# Patient Record
Sex: MALE | Race: WHITE | Employment: FULL TIME | URBAN - METROPOLITAN AREA
[De-identification: names, ages, dates, MRNs, and addresses within clinical notes are randomized per-mention and may not be internally consistent; named-entity substitution may affect disease eponyms.]

---

## 2017-09-26 ENCOUNTER — OFFICE VISIT (OUTPATIENT)
Dept: FAMILY MEDICINE CLINIC | Age: 57
End: 2017-09-26

## 2017-09-26 VITALS
DIASTOLIC BLOOD PRESSURE: 110 MMHG | RESPIRATION RATE: 16 BRPM | TEMPERATURE: 98.8 F | HEART RATE: 68 BPM | WEIGHT: 229 LBS | BODY MASS INDEX: 30.35 KG/M2 | SYSTOLIC BLOOD PRESSURE: 168 MMHG | HEIGHT: 73 IN

## 2017-09-26 DIAGNOSIS — H00.015 HORDEOLUM EXTERNUM OF LEFT LOWER EYELID: Primary | ICD-10-CM

## 2017-09-26 DIAGNOSIS — I10 ESSENTIAL HYPERTENSION: ICD-10-CM

## 2017-09-26 PROCEDURE — 99212 OFFICE O/P EST SF 10 MIN: CPT | Performed by: INTERNAL MEDICINE

## 2017-09-26 RX ORDER — SULFAMETHOXAZOLE AND TRIMETHOPRIM 800; 160 MG/1; MG/1
1 TABLET ORAL 2 TIMES DAILY
Qty: 14 TABLET | Refills: 0 | Status: SHIPPED | OUTPATIENT
Start: 2017-09-26 | End: 2017-10-03

## 2017-09-26 ASSESSMENT — ENCOUNTER SYMPTOMS
EYE DISCHARGE: 0
EYE ITCHING: 0
ABDOMINAL DISTENTION: 0
COLOR CHANGE: 0
BACK PAIN: 0
EYE PAIN: 1
BLURRED VISION: 0
DOUBLE VISION: 0
SHORTNESS OF BREATH: 0
CONSTIPATION: 0
WHEEZING: 0
FOREIGN BODY SENSATION: 0
TROUBLE SWALLOWING: 0
EYE REDNESS: 1
BLOOD IN STOOL: 0
COUGH: 0
SORE THROAT: 0
ABDOMINAL PAIN: 0
VOICE CHANGE: 0
DIARRHEA: 0
NAUSEA: 0
PHOTOPHOBIA: 0

## 2017-09-26 ASSESSMENT — PATIENT HEALTH QUESTIONNAIRE - PHQ9
SUM OF ALL RESPONSES TO PHQ9 QUESTIONS 1 & 2: 0
SUM OF ALL RESPONSES TO PHQ QUESTIONS 1-9: 0
2. FEELING DOWN, DEPRESSED OR HOPELESS: 0
1. LITTLE INTEREST OR PLEASURE IN DOING THINGS: 0

## 2017-10-30 DIAGNOSIS — I10 ESSENTIAL HYPERTENSION: ICD-10-CM

## 2017-10-30 RX ORDER — LOSARTAN POTASSIUM 100 MG/1
TABLET ORAL
Qty: 90 TABLET | Refills: 3 | Status: SHIPPED | OUTPATIENT
Start: 2017-10-30 | End: 2018-10-02 | Stop reason: SDUPTHER

## 2017-10-30 NOTE — TELEPHONE ENCOUNTER
Pharmacy REQUESTING REFILL. ORDER PENDED.  Angel Luis 72.    LOV-12/1/2016    Future Appointments  Date Time Provider Genaro Abreu   11/28/2017 1:30 PM DO DEANDRA Zamora [facility] PCP [facility] EMERGENCY [facility] AT [place]

## 2017-11-28 ENCOUNTER — OFFICE VISIT (OUTPATIENT)
Dept: FAMILY MEDICINE CLINIC | Age: 57
End: 2017-11-28

## 2017-11-28 VITALS
BODY MASS INDEX: 29.55 KG/M2 | DIASTOLIC BLOOD PRESSURE: 80 MMHG | RESPIRATION RATE: 14 BRPM | WEIGHT: 223 LBS | HEART RATE: 66 BPM | TEMPERATURE: 98.1 F | SYSTOLIC BLOOD PRESSURE: 132 MMHG | HEIGHT: 73 IN

## 2017-11-28 DIAGNOSIS — Z23 NEED FOR INFLUENZA VACCINATION: ICD-10-CM

## 2017-11-28 DIAGNOSIS — I10 BENIGN ESSENTIAL HTN: ICD-10-CM

## 2017-11-28 DIAGNOSIS — Z23 NEED FOR PROPHYLACTIC VACCINATION AGAINST DIPHTHERIA-TETANUS-PERTUSSIS (DTP): ICD-10-CM

## 2017-11-28 DIAGNOSIS — Z00.00 ANNUAL PHYSICAL EXAM: Primary | ICD-10-CM

## 2017-11-28 DIAGNOSIS — Z12.5 PROSTATE CANCER SCREENING: ICD-10-CM

## 2017-11-28 PROCEDURE — 90688 IIV4 VACCINE SPLT 0.5 ML IM: CPT | Performed by: FAMILY MEDICINE

## 2017-11-28 PROCEDURE — 90472 IMMUNIZATION ADMIN EACH ADD: CPT | Performed by: FAMILY MEDICINE

## 2017-11-28 PROCEDURE — 90715 TDAP VACCINE 7 YRS/> IM: CPT | Performed by: FAMILY MEDICINE

## 2017-11-28 PROCEDURE — 99396 PREV VISIT EST AGE 40-64: CPT | Performed by: FAMILY MEDICINE

## 2017-11-28 PROCEDURE — 90471 IMMUNIZATION ADMIN: CPT | Performed by: FAMILY MEDICINE

## 2017-11-28 NOTE — PROGRESS NOTES
Subjective  Leyda Osborne, 65 y.o. male presents today with:  Chief Complaint   Patient presents with    Annual Exam       HPI--for annual review  Active and doing well  ; No cardio-pulmonary probs;compliant with diet/rxs;no new gi-gu complaints   Rev/rxs; No abuse nor side effects on meds needs 2-shots  No new sxs  Disc routines;neg c-scope 2012[due in 10y]   Patient presents today for an annual examination. Vaccine Information Sheet, \"Influenza - Inactivated\" OR \"Live - Intranasal\"  given to Leyda Osborne. Patient responses:    Have you ever had a reaction to a flu vaccine? No  Are you able to eat eggs without adverse effects? Yes  Do you have any current illness? No  Have you ever had [place] Syndrome? No    Flu vaccine given per order. Please see immunization tab. Review of Systems   Constitutional: Negative for fatigue, fever and unexpected weight change. Eyes: Negative for visual disturbance. Respiratory: Negative for shortness of breath. Cardiovascular: Negative for chest pain, palpitations and leg swelling. Gastrointestinal: Negative for abdominal pain, blood in stool and nausea. Genitourinary: Positive for frequency. Negative for enuresis, hematuria and testicular pain. Musculoskeletal: Positive for myalgias. Negative for arthralgias. Skin: Negative for rash. Neurological: Negative for tremors, light-headedness and headaches. Hematological: Does not bruise/bleed easily. Psychiatric/Behavioral: Negative for dysphoric mood.        No Known Allergies    Past Medical History:   Diagnosis Date    Diarrhea     INTERMITTENT    Hypertension     Pneumonia 04-30-08    LEFT LOWER LOBE    Tendinitis     RIGHT ROTATOR CUFF    Tobacco dependence in remission      Past Surgical History:   Procedure Laterality Date    APPENDECTOMY      COLONOSCOPY  12/3/12    DR Jaz Soares    ROTATOR CUFF REPAIR  2007    RIGHT    TONSILLECTOMY AND ADENOIDECTOMY      AS A CHILD medications. Return in about 4 months (around 3/28/2018) for htn.  ;See above orders, as use and side effects reviewed ; Outpatient Encounter Prescriptions as of 11/28/2017   Medication Sig Dispense Refill    losartan (COZAAR) 100 MG tablet TAKE 1 TABLET BY MOUTH  DAILY 90 tablet 3    metoprolol succinate (TOPROL XL) 50 MG extended release tablet TAKE 1 TABLET BY MOUTH NIGHTLY 90 tablet 3     No facility-administered encounter medications on file as of 11/28/2017. Call or return to clinic prn if these symptoms worsen or fail to improve as anticipated. Octavio Curiel, DO     ;;  Hospital Outpatient Visit on 08/18/2016   Component Date Value Ref Range Status    Sodium 08/18/2016 140  132 - 144 mEq/L Final    Potassium 08/18/2016 4.3  3.5 - 5.1 mEq/L Final    Chloride 08/18/2016 103  98 - 107 mEq/L Final    CO2 08/18/2016 25  22 - 29 mEq/L Final    Anion Gap 08/18/2016 12  7 - 13 mEq/L Final    Glucose 08/18/2016 90  74 - 109 mg/dL Final    BUN 08/18/2016 14  6 - 20 mg/dL Final    CREATININE 08/18/2016 0.75  0.70 - 1.20 mg/dL Final    GFR Non- 08/18/2016 >60.0  >60 Final    Comment: >60 mL/min/1.73m2 EGFR, calc. for ages 25 and older using the  MDRD formula (not corrected for weight), is valid for stable  renal function.  GFR  08/18/2016 >60.0  >60 Final    Comment: >60 mL/min/1.73m2 EGFR, calc. for ages 25 and older using the  MDRD formula (not corrected for weight), is valid for stable  renal function.  Calcium 08/18/2016 8.9  8.6 - 10.2 mg/dL Final    Cholesterol, Total 08/18/2016 179  0 - 199 mg/dL Final    Triglycerides 08/18/2016 239* 0 - 200 mg/dL Final    HDL 08/18/2016 34* 40 - 59 mg/dL Final    Comment: ATP III HDL Cholestrol Classification is low.   Expected Values:  Males:    >55 = No Risk            35-55 = Moderate Risk            <35 = High Risk  Females:  >65 = No Risk            45-65 = Moderate Risk            <45 = High Risk  NCEP Guidelines: Third Report May 2001  >59 = negative risk factor for CHD  <40 = major risk factor for CHD      LDL Calculated 08/18/2016 97  0 - 129 mg/dL Final    PSA 08/18/2016 2.60  0.00 - 3.89 ng/mL Final    Comment: When the Total PSA is between 3.00 and 10.00 ng/mL, consider  requesting a Free PSA to aid in diagnosis. Assessment & Plan   1. Annual physical exam  Tdap (age 10y-63y) IM (Adacel)    Lipid Panel    Comprehensive Metabolic Panel    Psa screening   2. Need for prophylactic vaccination against diphtheria-tetanus-pertussis (DTP)  Tdap (age 10y-63y) IM (Adacel)   3. Need for influenza vaccination  INFLUENZA, QUADV, 3 YRS AND OLDER, IM, MDV, 0.5ML (Kirke Patee)   4. Benign essential HTN  Lipid Panel    Comprehensive Metabolic Panel   5. Prostate cancer screening  Psa screening     Orders Placed This Encounter   Procedures    Tdap (age 10y-63y) IM (Adacel)    INFLUENZA, QUADV, 3 YRS AND OLDER, IM, MDV, 0.5ML (FLUZONE QUADV)    Lipid Panel     Standing Status:   Future     Number of Occurrences:   1     Standing Expiration Date:   11/28/2018     Order Specific Question:   Is Patient Fasting?/# of Hours     Answer:   10 HOURS    Comprehensive Metabolic Panel     Standing Status:   Future     Number of Occurrences:   1     Standing Expiration Date:   11/28/2018    Psa screening     Standing Status:   Future     Number of Occurrences:   1     Standing Expiration Date:   11/28/2018     No orders of the defined types were placed in this encounter. There are no discontinued medications. Return in about 4 months (around 3/28/2018) for htn. Call or return to clinic prn if these symptoms worsen or fail to improve as anticipated.       Brandon Francis,

## 2017-12-01 DIAGNOSIS — Z12.5 PROSTATE CANCER SCREENING: ICD-10-CM

## 2017-12-01 DIAGNOSIS — Z00.00 ANNUAL PHYSICAL EXAM: ICD-10-CM

## 2017-12-01 DIAGNOSIS — I10 BENIGN ESSENTIAL HTN: ICD-10-CM

## 2017-12-01 LAB
ALBUMIN SERPL-MCNC: 4.5 G/DL (ref 3.9–4.9)
ALP BLD-CCNC: 99 U/L (ref 35–104)
ALT SERPL-CCNC: 27 U/L (ref 0–41)
ANION GAP SERPL CALCULATED.3IONS-SCNC: 13 MEQ/L (ref 7–13)
AST SERPL-CCNC: 24 U/L (ref 0–40)
BILIRUB SERPL-MCNC: 0.7 MG/DL (ref 0–1.2)
BUN BLDV-MCNC: 12 MG/DL (ref 6–20)
CALCIUM SERPL-MCNC: 9.4 MG/DL (ref 8.6–10.2)
CHLORIDE BLD-SCNC: 100 MEQ/L (ref 98–107)
CHOLESTEROL, TOTAL: 205 MG/DL (ref 0–199)
CO2: 27 MEQ/L (ref 22–29)
CREAT SERPL-MCNC: 0.81 MG/DL (ref 0.7–1.2)
GFR AFRICAN AMERICAN: >60
GFR NON-AFRICAN AMERICAN: >60
GLOBULIN: 2.6 G/DL (ref 2.3–3.5)
GLUCOSE BLD-MCNC: 95 MG/DL (ref 74–109)
HDLC SERPL-MCNC: 35 MG/DL (ref 40–59)
LDL CHOLESTEROL CALCULATED: 127 MG/DL (ref 0–129)
POTASSIUM SERPL-SCNC: 4.5 MEQ/L (ref 3.5–5.1)
PROSTATE SPECIFIC ANTIGEN: 2.64 NG/ML (ref 0–3.89)
SODIUM BLD-SCNC: 140 MEQ/L (ref 132–144)
TOTAL PROTEIN: 7.1 G/DL (ref 6.4–8.1)
TRIGL SERPL-MCNC: 214 MG/DL (ref 0–200)

## 2017-12-10 ASSESSMENT — ENCOUNTER SYMPTOMS
BLOOD IN STOOL: 0
SHORTNESS OF BREATH: 0
ABDOMINAL PAIN: 0
NAUSEA: 0

## 2018-02-12 ENCOUNTER — TELEPHONE (OUTPATIENT)
Dept: FAMILY MEDICINE CLINIC | Age: 58
End: 2018-02-12

## 2018-02-12 DIAGNOSIS — I10 ESSENTIAL HYPERTENSION: ICD-10-CM

## 2018-02-12 RX ORDER — METOPROLOL SUCCINATE 50 MG/1
TABLET, EXTENDED RELEASE ORAL
Qty: 90 TABLET | Refills: 3 | Status: SHIPPED | OUTPATIENT
Start: 2018-02-12 | End: 2018-10-02 | Stop reason: SDUPTHER

## 2018-03-27 ENCOUNTER — OFFICE VISIT (OUTPATIENT)
Dept: FAMILY MEDICINE CLINIC | Age: 58
End: 2018-03-27

## 2018-03-27 VITALS
DIASTOLIC BLOOD PRESSURE: 102 MMHG | RESPIRATION RATE: 16 BRPM | HEART RATE: 57 BPM | WEIGHT: 232 LBS | SYSTOLIC BLOOD PRESSURE: 160 MMHG | BODY MASS INDEX: 30.75 KG/M2 | HEIGHT: 73 IN | TEMPERATURE: 97.6 F

## 2018-03-27 DIAGNOSIS — Z71.6 TOBACCO ABUSE COUNSELING: ICD-10-CM

## 2018-03-27 DIAGNOSIS — I10 ESSENTIAL HYPERTENSION: Primary | ICD-10-CM

## 2018-03-27 PROCEDURE — 99213 OFFICE O/P EST LOW 20 MIN: CPT | Performed by: FAMILY MEDICINE

## 2018-03-27 RX ORDER — VARENICLINE TARTRATE 25 MG
KIT ORAL
Qty: 53 TABLET | Refills: 1 | Status: SHIPPED | OUTPATIENT
Start: 2018-03-27 | End: 2018-06-01 | Stop reason: ALTCHOICE

## 2018-03-27 NOTE — PROGRESS NOTES
use: No    Sexual activity: Not on file     Other Topics Concern    Not on file     Social History Narrative    No narrative on file     Family History   Problem Relation Age of Onset    Heart Disease Father     Other Father      LUNG PROBLEMS          Current Outpatient Prescriptions on File Prior to Visit   Medication Sig Dispense Refill    metoprolol succinate (TOPROL XL) 50 MG extended release tablet TAKE 1 TABLET BY MOUTH NIGHTLY 90 tablet 3    losartan (COZAAR) 100 MG tablet TAKE 1 TABLET BY MOUTH  DAILY 90 tablet 3     No current facility-administered medications on file prior to visit. Objective    Vitals:    03/27/18 0951   BP: (!) 160/102   Pulse: 57   Resp: 16   Temp: 97.6 °F (36.4 °C)   TempSrc: Temporal   Weight: 232 lb (105.2 kg)   Height: 6' 0.5\" (1.842 m)     Physical Exam   Constitutional: He is well-developed, well-nourished, and in no distress. No distress. Eyes: No scleral icterus. Neck: Normal range of motion. Neck supple. Carotid bruit is not present. No neck rigidity. No thyroid mass and no thyromegaly present. Cardiovascular: Normal rate, regular rhythm, S1 normal, S2 normal and normal heart sounds. No extrasystoles are present. No murmur heard. Pulmonary/Chest: Effort normal and breath sounds normal.   Musculoskeletal: He exhibits no edema. Skin: He is not diaphoretic. Psychiatric: Mood and affect normal.   ;Normal sensory/vascular foot exam and no lesions bilaterally   Orders Only on 12/01/2017   Component Date Value Ref Range Status    Cholesterol, Total 12/01/2017 205* 0 - 199 mg/dL Final    Triglycerides 12/01/2017 214* 0 - 200 mg/dL Final    HDL 12/01/2017 35* 40 - 59 mg/dL Final    Comment: ATP III HDL Cholestrol Classification is low. Expected Values:    Males:    >55 = No Risk            35-55 = Moderate Risk            <35 = High Risk    Females:  >65 = No Risk            45-65 = Moderate Risk            <45 = High Risk    NCEP Guidelines:   Third in this encounter. Orders Placed This Encounter   Medications    varenicline (CHANTIX STARTING MONTH PAK) 0.5 MG X 11 & 1 MG X 42 tablet     Sig: Take by mouth. Dispense:  53 tablet     Refill:  1   ;  Outpatient Encounter Prescriptions as of 3/27/2018   Medication Sig Dispense Refill    varenicline (CHANTIX STARTING MONTH PAK) 0.5 MG X 11 & 1 MG X 42 tablet Take by mouth. 53 tablet 1    metoprolol succinate (TOPROL XL) 50 MG extended release tablet TAKE 1 TABLET BY MOUTH NIGHTLY 90 tablet 3    losartan (COZAAR) 100 MG tablet TAKE 1 TABLET BY MOUTH  DAILY 90 tablet 3     No facility-administered encounter medications on file as of 3/27/2018. There are no discontinued medications. Return in about 2 months (around 5/27/2018) for htn. Call or return to clinic prn if these symptoms worsen or fail to improve as anticipated.       Victoria Ibarra, DO

## 2018-03-29 ASSESSMENT — ENCOUNTER SYMPTOMS
SHORTNESS OF BREATH: 0
ABDOMINAL PAIN: 0
COUGH: 1
CHOKING: 0
NAUSEA: 0

## 2018-04-24 ENCOUNTER — TELEPHONE (OUTPATIENT)
Dept: FAMILY MEDICINE CLINIC | Age: 58
End: 2018-04-24

## 2018-04-26 DIAGNOSIS — Z72.0 TOBACCO ABUSE DISORDER: Primary | ICD-10-CM

## 2018-04-26 RX ORDER — BUPROPION HYDROCHLORIDE 150 MG/1
TABLET, EXTENDED RELEASE ORAL
Qty: 60 TABLET | Refills: 3 | Status: SHIPPED | OUTPATIENT
Start: 2018-04-26

## 2018-06-01 ENCOUNTER — OFFICE VISIT (OUTPATIENT)
Dept: FAMILY MEDICINE CLINIC | Age: 58
End: 2018-06-01

## 2018-06-01 VITALS
HEIGHT: 73 IN | WEIGHT: 212 LBS | TEMPERATURE: 97.5 F | RESPIRATION RATE: 16 BRPM | HEART RATE: 66 BPM | DIASTOLIC BLOOD PRESSURE: 80 MMHG | BODY MASS INDEX: 28.1 KG/M2 | SYSTOLIC BLOOD PRESSURE: 136 MMHG

## 2018-06-01 DIAGNOSIS — I10 ESSENTIAL HYPERTENSION: Primary | ICD-10-CM

## 2018-06-01 DIAGNOSIS — F17.201 TOBACCO DEPENDENCE IN REMISSION: ICD-10-CM

## 2018-06-01 PROCEDURE — 99213 OFFICE O/P EST LOW 20 MIN: CPT | Performed by: FAMILY MEDICINE

## 2018-06-07 ASSESSMENT — ENCOUNTER SYMPTOMS
BLOOD IN STOOL: 0
SHORTNESS OF BREATH: 0
ABDOMINAL PAIN: 0
CHEST TIGHTNESS: 0

## 2018-10-02 DIAGNOSIS — I10 ESSENTIAL HYPERTENSION: ICD-10-CM

## 2018-10-02 RX ORDER — METOPROLOL SUCCINATE 50 MG/1
TABLET, EXTENDED RELEASE ORAL
Qty: 90 TABLET | Refills: 3 | Status: SHIPPED | OUTPATIENT
Start: 2018-10-02 | End: 2018-10-12 | Stop reason: SDUPTHER

## 2018-10-02 RX ORDER — LOSARTAN POTASSIUM 100 MG/1
TABLET ORAL
Qty: 90 TABLET | Refills: 3 | Status: SHIPPED | OUTPATIENT
Start: 2018-10-02 | End: 2018-10-12 | Stop reason: SDUPTHER

## 2018-10-02 NOTE — TELEPHONE ENCOUNTER
Pt requesting refill please approve or deny.    Last seen 6/1/2018      Future Appointments  Date Time Provider Genaro Abreu   10/5/2018 2:00 PM Michael Velázquez DO [address]

## 2018-10-05 ENCOUNTER — OFFICE VISIT (OUTPATIENT)
Dept: FAMILY MEDICINE CLINIC | Age: 58
End: 2018-10-05
Payer: COMMERCIAL

## 2018-10-05 VITALS
SYSTOLIC BLOOD PRESSURE: 144 MMHG | TEMPERATURE: 98 F | HEART RATE: 67 BPM | WEIGHT: 228 LBS | DIASTOLIC BLOOD PRESSURE: 98 MMHG | RESPIRATION RATE: 14 BRPM | BODY MASS INDEX: 30.22 KG/M2 | HEIGHT: 73 IN

## 2018-10-05 DIAGNOSIS — I10 ESSENTIAL HYPERTENSION: Primary | ICD-10-CM

## 2018-10-05 DIAGNOSIS — Z23 NEED FOR PROPHYLACTIC VACCINATION AGAINST STREPTOCOCCUS PNEUMONIAE (PNEUMOCOCCUS): ICD-10-CM

## 2018-10-05 DIAGNOSIS — Z12.5 PROSTATE CANCER SCREENING: ICD-10-CM

## 2018-10-05 DIAGNOSIS — Z23 NEED FOR INFLUENZA VACCINATION: ICD-10-CM

## 2018-10-05 PROCEDURE — 90688 IIV4 VACCINE SPLT 0.5 ML IM: CPT | Performed by: FAMILY MEDICINE

## 2018-10-05 PROCEDURE — G8417 CALC BMI ABV UP PARAM F/U: HCPCS | Performed by: FAMILY MEDICINE

## 2018-10-05 PROCEDURE — 90732 PPSV23 VACC 2 YRS+ SUBQ/IM: CPT | Performed by: FAMILY MEDICINE

## 2018-10-05 PROCEDURE — 99213 OFFICE O/P EST LOW 20 MIN: CPT | Performed by: FAMILY MEDICINE

## 2018-10-05 PROCEDURE — G8427 DOCREV CUR MEDS BY ELIG CLIN: HCPCS | Performed by: FAMILY MEDICINE

## 2018-10-05 PROCEDURE — 90471 IMMUNIZATION ADMIN: CPT | Performed by: FAMILY MEDICINE

## 2018-10-05 PROCEDURE — 90472 IMMUNIZATION ADMIN EACH ADD: CPT | Performed by: FAMILY MEDICINE

## 2018-10-05 PROCEDURE — G8482 FLU IMMUNIZE ORDER/ADMIN: HCPCS | Performed by: FAMILY MEDICINE

## 2018-10-05 PROCEDURE — 4004F PT TOBACCO SCREEN RCVD TLK: CPT | Performed by: FAMILY MEDICINE

## 2018-10-05 PROCEDURE — 3017F COLORECTAL CA SCREEN DOC REV: CPT | Performed by: FAMILY MEDICINE

## 2018-10-05 RX ORDER — NEBIVOLOL 5 MG/1
5 TABLET ORAL DAILY
Qty: 7 TABLET | Refills: 0 | COMMUNITY
Start: 2018-10-05 | End: 2018-12-19 | Stop reason: ALTCHOICE

## 2018-10-05 ASSESSMENT — PATIENT HEALTH QUESTIONNAIRE - PHQ9
SUM OF ALL RESPONSES TO PHQ9 QUESTIONS 1 & 2: 0
SUM OF ALL RESPONSES TO PHQ QUESTIONS 1-9: 0
2. FEELING DOWN, DEPRESSED OR HOPELESS: 0
1. LITTLE INTEREST OR PLEASURE IN DOING THINGS: 0
SUM OF ALL RESPONSES TO PHQ QUESTIONS 1-9: 0

## 2018-10-05 NOTE — PROGRESS NOTES
name: N/A    Number of children: N/A    Years of education: N/A     Occupational History    Not on file. Social History Main Topics    Smoking status: Current Some Day Smoker     Packs/day: 1.00     Years: 30.00     Types: Cigarettes     Last attempt to quit: 3/6/2015    Smokeless tobacco: Never Used    Alcohol use Yes      Comment: occasionally    Drug use: No    Sexual activity: Not on file     Other Topics Concern    Not on file     Social History Narrative    No narrative on file     Family History   Problem Relation Age of Onset    Heart Disease Father     Other Father         LUNG PROBLEMS          Current Outpatient Prescriptions on File Prior to Visit   Medication Sig Dispense Refill    buPROPion (WELLBUTRIN SR) 150 MG extended release tablet Take 1 daily in am x 3 days, then 1 bid at breakfast and lunch thereafter 60 tablet 3     No current facility-administered medications on file prior to visit. Objective    Vitals:    10/05/18 1408   BP: (!) 144/98   Site: Right Upper Arm   Position: Sitting   Cuff Size: Large Adult   Pulse: 67   Resp: 14   Temp: 98 °F (36.7 °C)   TempSrc: Temporal   Weight: 228 lb (103.4 kg)   Height: 6' 0.5\" (1.842 m)   150/96[off rxs today]  Physical Exam   Constitutional: He is oriented to person, place, and time and well-developed, well-nourished, and in no distress. No distress. Eyes: No scleral icterus. Neck: Normal range of motion and full passive range of motion without pain. Neck supple. No JVD present. Carotid bruit is not present. No neck rigidity. No edema present. No thyroid mass and no thyromegaly present. Cardiovascular: Normal rate, regular rhythm, S1 normal, S2 normal and normal heart sounds. No extrasystoles are present. No murmur heard. Pulmonary/Chest: Effort normal and breath sounds normal. No stridor. Musculoskeletal: He exhibits no edema. Neurological: He is alert and oriented to person, place, and time.  He has normal

## 2018-10-11 ENCOUNTER — TELEPHONE (OUTPATIENT)
Dept: FAMILY MEDICINE CLINIC | Age: 58
End: 2018-10-11

## 2018-10-11 DIAGNOSIS — I10 ESSENTIAL HYPERTENSION: ICD-10-CM

## 2018-10-12 RX ORDER — LOSARTAN POTASSIUM 100 MG/1
TABLET ORAL
Qty: 90 TABLET | Refills: 3 | Status: SHIPPED | OUTPATIENT
Start: 2018-10-12

## 2018-10-12 RX ORDER — METOPROLOL SUCCINATE 50 MG/1
TABLET, EXTENDED RELEASE ORAL
Qty: 90 TABLET | Refills: 3 | Status: SHIPPED | OUTPATIENT
Start: 2018-10-12

## 2018-10-14 ASSESSMENT — ENCOUNTER SYMPTOMS
CONSTIPATION: 0
BLOOD IN STOOL: 0
SHORTNESS OF BREATH: 0
ANAL BLEEDING: 0

## 2018-12-12 DIAGNOSIS — I10 ESSENTIAL HYPERTENSION: ICD-10-CM

## 2018-12-12 DIAGNOSIS — Z12.5 PROSTATE CANCER SCREENING: ICD-10-CM

## 2018-12-12 LAB
ALBUMIN SERPL-MCNC: 4.5 G/DL (ref 3.9–4.9)
ALP BLD-CCNC: 110 U/L (ref 35–104)
ALT SERPL-CCNC: 48 U/L (ref 0–41)
ANION GAP SERPL CALCULATED.3IONS-SCNC: 14 MEQ/L (ref 7–13)
AST SERPL-CCNC: 36 U/L (ref 0–40)
BILIRUB SERPL-MCNC: 0.8 MG/DL (ref 0–1.2)
BUN BLDV-MCNC: 12 MG/DL (ref 6–20)
CALCIUM SERPL-MCNC: 9.2 MG/DL (ref 8.6–10.2)
CHLORIDE BLD-SCNC: 100 MEQ/L (ref 98–107)
CHOLESTEROL, TOTAL: 195 MG/DL (ref 0–199)
CO2: 26 MEQ/L (ref 22–29)
CREAT SERPL-MCNC: 0.98 MG/DL (ref 0.7–1.2)
GFR AFRICAN AMERICAN: >60
GFR NON-AFRICAN AMERICAN: >60
GLOBULIN: 3 G/DL (ref 2.3–3.5)
GLUCOSE BLD-MCNC: 92 MG/DL (ref 74–109)
HDLC SERPL-MCNC: 36 MG/DL (ref 40–59)
LDL CHOLESTEROL CALCULATED: 117 MG/DL (ref 0–129)
POTASSIUM SERPL-SCNC: 4.5 MEQ/L (ref 3.5–5.1)
PROSTATE SPECIFIC ANTIGEN: 3.38 NG/ML (ref 0–3.89)
SODIUM BLD-SCNC: 140 MEQ/L (ref 132–144)
TOTAL PROTEIN: 7.5 G/DL (ref 6.4–8.1)
TRIGL SERPL-MCNC: 208 MG/DL (ref 0–200)

## 2018-12-19 ENCOUNTER — OFFICE VISIT (OUTPATIENT)
Dept: FAMILY MEDICINE CLINIC | Age: 58
End: 2018-12-19
Payer: COMMERCIAL

## 2018-12-19 VITALS
WEIGHT: 233 LBS | SYSTOLIC BLOOD PRESSURE: 144 MMHG | BODY MASS INDEX: 30.88 KG/M2 | HEIGHT: 73 IN | TEMPERATURE: 98.3 F | RESPIRATION RATE: 14 BRPM | DIASTOLIC BLOOD PRESSURE: 102 MMHG | HEART RATE: 77 BPM

## 2018-12-19 DIAGNOSIS — N41.0 ACUTE PROSTATITIS: ICD-10-CM

## 2018-12-19 DIAGNOSIS — N40.1 BENIGN PROSTATIC HYPERPLASIA WITH LOWER URINARY TRACT SYMPTOMS, SYMPTOM DETAILS UNSPECIFIED: ICD-10-CM

## 2018-12-19 DIAGNOSIS — I10 ESSENTIAL HYPERTENSION: Primary | ICD-10-CM

## 2018-12-19 PROCEDURE — G8417 CALC BMI ABV UP PARAM F/U: HCPCS | Performed by: FAMILY MEDICINE

## 2018-12-19 PROCEDURE — 4004F PT TOBACCO SCREEN RCVD TLK: CPT | Performed by: FAMILY MEDICINE

## 2018-12-19 PROCEDURE — G8427 DOCREV CUR MEDS BY ELIG CLIN: HCPCS | Performed by: FAMILY MEDICINE

## 2018-12-19 PROCEDURE — G8482 FLU IMMUNIZE ORDER/ADMIN: HCPCS | Performed by: FAMILY MEDICINE

## 2018-12-19 PROCEDURE — 99214 OFFICE O/P EST MOD 30 MIN: CPT | Performed by: FAMILY MEDICINE

## 2018-12-19 PROCEDURE — 3017F COLORECTAL CA SCREEN DOC REV: CPT | Performed by: FAMILY MEDICINE

## 2018-12-19 RX ORDER — TERAZOSIN 2 MG/1
2 CAPSULE ORAL NIGHTLY
Qty: 30 CAPSULE | Refills: 3 | Status: SHIPPED | OUTPATIENT
Start: 2018-12-19 | End: 2019-02-20 | Stop reason: SDUPTHER

## 2018-12-19 RX ORDER — SULFAMETHOXAZOLE AND TRIMETHOPRIM 800; 160 MG/1; MG/1
1 TABLET ORAL 2 TIMES DAILY
Qty: 28 TABLET | Refills: 0 | Status: SHIPPED | OUTPATIENT
Start: 2018-12-19 | End: 2019-01-02

## 2018-12-19 NOTE — PROGRESS NOTES
Sig Dispense Refill    metoprolol succinate (TOPROL XL) 50 MG extended release tablet TAKE 1 TABLET BY MOUTH NIGHTLY 90 tablet 3    losartan (COZAAR) 100 MG tablet Take 1 tab daily 90 tablet 3    buPROPion (WELLBUTRIN SR) 150 MG extended release tablet Take 1 daily in am x 3 days, then 1 bid at breakfast and lunch thereafter 60 tablet 3    terazosin (HYTRIN) 2 MG capsule Take 1 capsule by mouth nightly At bedtime 30 capsule 3    sulfamethoxazole-trimethoprim (BACTRIM DS;SEPTRA DS) 800-160 MG per tablet Take 1 tablet by mouth 2 times daily for 14 days Take with food 28 tablet 0    [DISCONTINUED] nebivolol (BYSTOLIC) 5 MG tablet Take 1 tablet by mouth daily 7 tablet 0     No facility-administered encounter medications on file as of 12/19/2018. No orders of the defined types were placed in this encounter. Orders Placed This Encounter   Medications    terazosin (HYTRIN) 2 MG capsule     Sig: Take 1 capsule by mouth nightly At bedtime     Dispense:  30 capsule     Refill:  3    sulfamethoxazole-trimethoprim (BACTRIM DS;SEPTRA DS) 800-160 MG per tablet     Sig: Take 1 tablet by mouth 2 times daily for 14 days Take with food     Dispense:  28 tablet     Refill:  0     Medications Discontinued During This Encounter   Medication Reason    nebivolol (BYSTOLIC) 5 MG tablet Therapy completed   callif worse  Return in about 2 months (around 2/19/2019) for htn/bph.bph/htn    Call or return to clinic prn if these symptoms worsen or fail to improve as anticipated.       Azul Lackey,

## 2018-12-26 ASSESSMENT — ENCOUNTER SYMPTOMS
CHEST TIGHTNESS: 0
SHORTNESS OF BREATH: 0
CONSTIPATION: 0
NAUSEA: 0
ABDOMINAL PAIN: 0

## 2019-02-20 ENCOUNTER — OFFICE VISIT (OUTPATIENT)
Dept: FAMILY MEDICINE CLINIC | Age: 59
End: 2019-02-20
Payer: COMMERCIAL

## 2019-02-20 VITALS
DIASTOLIC BLOOD PRESSURE: 88 MMHG | WEIGHT: 231 LBS | HEART RATE: 58 BPM | TEMPERATURE: 96.8 F | RESPIRATION RATE: 16 BRPM | BODY MASS INDEX: 30.62 KG/M2 | HEIGHT: 73 IN | SYSTOLIC BLOOD PRESSURE: 140 MMHG

## 2019-02-20 DIAGNOSIS — I10 ESSENTIAL HYPERTENSION: Primary | ICD-10-CM

## 2019-02-20 DIAGNOSIS — N40.1 BENIGN PROSTATIC HYPERPLASIA WITH LOWER URINARY TRACT SYMPTOMS, SYMPTOM DETAILS UNSPECIFIED: ICD-10-CM

## 2019-02-20 PROCEDURE — 99213 OFFICE O/P EST LOW 20 MIN: CPT | Performed by: FAMILY MEDICINE

## 2019-02-20 PROCEDURE — G8427 DOCREV CUR MEDS BY ELIG CLIN: HCPCS | Performed by: FAMILY MEDICINE

## 2019-02-20 PROCEDURE — G8482 FLU IMMUNIZE ORDER/ADMIN: HCPCS | Performed by: FAMILY MEDICINE

## 2019-02-20 PROCEDURE — 4004F PT TOBACCO SCREEN RCVD TLK: CPT | Performed by: FAMILY MEDICINE

## 2019-02-20 PROCEDURE — G8417 CALC BMI ABV UP PARAM F/U: HCPCS | Performed by: FAMILY MEDICINE

## 2019-02-20 PROCEDURE — 3017F COLORECTAL CA SCREEN DOC REV: CPT | Performed by: FAMILY MEDICINE

## 2019-02-20 RX ORDER — TERAZOSIN 2 MG/1
2 CAPSULE ORAL NIGHTLY
Qty: 90 CAPSULE | Refills: 3 | Status: SHIPPED | OUTPATIENT
Start: 2019-02-20

## 2019-02-20 ASSESSMENT — PATIENT HEALTH QUESTIONNAIRE - PHQ9
2. FEELING DOWN, DEPRESSED OR HOPELESS: 0
SUM OF ALL RESPONSES TO PHQ9 QUESTIONS 1 & 2: 0
1. LITTLE INTEREST OR PLEASURE IN DOING THINGS: 0
SUM OF ALL RESPONSES TO PHQ QUESTIONS 1-9: 0
SUM OF ALL RESPONSES TO PHQ QUESTIONS 1-9: 0

## 2019-02-27 ASSESSMENT — ENCOUNTER SYMPTOMS
NAUSEA: 0
BLOOD IN STOOL: 0
SHORTNESS OF BREATH: 0
ABDOMINAL PAIN: 0

## 2019-03-01 ENCOUNTER — TELEPHONE (OUTPATIENT)
Dept: FAMILY MEDICINE CLINIC | Age: 59
End: 2019-03-01

## 2023-02-07 PROBLEM — I10 HTN (HYPERTENSION): Status: ACTIVE | Noted: 2023-02-07

## 2023-02-07 PROBLEM — E78.5 DYSLIPIDEMIA: Status: ACTIVE | Noted: 2023-02-07

## 2023-02-07 PROBLEM — N41.0 ACUTE BACTERIAL PROSTATITIS: Status: ACTIVE | Noted: 2023-02-07

## 2023-02-07 PROBLEM — R19.7 DIARRHEA: Status: RESOLVED | Noted: 2023-02-07 | Resolved: 2023-02-07

## 2023-02-07 PROBLEM — S76.919S: Status: ACTIVE | Noted: 2023-02-07

## 2023-02-07 PROBLEM — R35.0 URINARY FREQUENCY: Status: RESOLVED | Noted: 2023-02-07 | Resolved: 2023-02-07

## 2023-02-07 RX ORDER — TAMSULOSIN HYDROCHLORIDE 0.4 MG/1
0.4 CAPSULE ORAL 2 TIMES DAILY
COMMUNITY
Start: 2022-10-24 | End: 2023-09-26 | Stop reason: SDUPTHER

## 2023-02-07 RX ORDER — LOSARTAN POTASSIUM 100 MG/1
1 TABLET ORAL DAILY
COMMUNITY
Start: 2020-10-05 | End: 2023-09-26 | Stop reason: SDUPTHER

## 2023-02-07 RX ORDER — METOPROLOL SUCCINATE 25 MG/1
1 TABLET, EXTENDED RELEASE ORAL DAILY
COMMUNITY
Start: 2020-09-17 | End: 2023-09-26 | Stop reason: SDUPTHER

## 2023-03-20 ENCOUNTER — OFFICE VISIT (OUTPATIENT)
Dept: PRIMARY CARE | Facility: CLINIC | Age: 63
End: 2023-03-20
Payer: COMMERCIAL

## 2023-03-20 VITALS
BODY MASS INDEX: 29.12 KG/M2 | RESPIRATION RATE: 16 BRPM | HEIGHT: 72 IN | OXYGEN SATURATION: 99 % | TEMPERATURE: 97.7 F | HEART RATE: 63 BPM | SYSTOLIC BLOOD PRESSURE: 128 MMHG | DIASTOLIC BLOOD PRESSURE: 80 MMHG | WEIGHT: 215 LBS

## 2023-03-20 DIAGNOSIS — N41.0 ACUTE BACTERIAL PROSTATITIS: ICD-10-CM

## 2023-03-20 DIAGNOSIS — I10 PRIMARY HYPERTENSION: Primary | ICD-10-CM

## 2023-03-20 PROCEDURE — 99213 OFFICE O/P EST LOW 20 MIN: CPT | Performed by: FAMILY MEDICINE

## 2023-03-20 PROCEDURE — 3074F SYST BP LT 130 MM HG: CPT | Performed by: FAMILY MEDICINE

## 2023-03-20 PROCEDURE — 3079F DIAST BP 80-89 MM HG: CPT | Performed by: FAMILY MEDICINE

## 2023-03-20 RX ORDER — SULFAMETHOXAZOLE AND TRIMETHOPRIM 800; 160 MG/1; MG/1
1 TABLET ORAL 2 TIMES DAILY
Qty: 28 TABLET | Refills: 1 | Status: SHIPPED | OUTPATIENT
Start: 2023-03-20 | End: 2023-04-03

## 2023-03-20 NOTE — PROGRESS NOTES
Subjective   Patient ID: Aman Blanco is a 62 y.o. male who presents for Hypertension (Follow up ) and Benign Prostatic Hypertrophy.  HPI  62-year-old gentleman here to recheck 2 issues versus hypertension.  He takes low-dose metoprolol 25 mg daily as well as 100 mg losartan patient denies any resting or exertional chest pain shortness with palpitations or new GI issues.  Earlier PSA was 5.2 at which time he had prostatitis.  Initially felt better on Cipro and the second course of Cipro stated that most of his dysuria improved although off the Cipro antibiotic for 3 to 4 weeks now his urinary frequency has returned --as well as mild late dysuria.  No testicular pain or swelling ; no flank pain abdominal pain hematuria or other GI complaints.  Portance of following up his PSA after adequate treatment and resolution of his prostate.  He does take Flomax intermittently at nighttime which has helped his nocturia and urinary urgency in the daytime.  Lab review LDL had improved meaning CMP was normal last January  Trying to reduce his earlier caffeine and alcohol intake  Review of Systems   Constitutional:  Negative for appetite change, fatigue and fever.   Eyes:  Negative for visual disturbance.   Respiratory:  Negative for cough and shortness of breath.    Cardiovascular:  Negative for chest pain, palpitations and leg swelling.   Gastrointestinal:  Negative for abdominal pain, blood in stool and nausea.   Genitourinary:  Positive for dysuria and frequency. Negative for flank pain, genital sores, hematuria and testicular pain.   Musculoskeletal:  Positive for myalgias. Negative for back pain.   Skin:  Negative for rash.   Neurological:  Negative for dizziness, weakness and headaches.   Hematological:  Negative for adenopathy.   Psychiatric/Behavioral:  Negative for dysphoric mood and sleep disturbance.        Objective   /80 (BP Location: Left arm, Patient Position: Sitting, BP Cuff Size: Large adult)   Pulse  63   Temp 36.5 °C (97.7 °F) (Temporal)   Resp 16   Ht 1.829 m (6')   Wt 97.5 kg (215 lb)   SpO2 99%   BMI 29.16 kg/m²     Lipid Panel  Order: [id number]  Status: Final result       Visible to patient: No (inaccessible in [facility])    0 Result Notes       1 HM Topic          Component Ref Range & Units 2 mo ago 1 yr ago 2 yr ago 3 yr ago   Cholesterol 0 - 199 mg/dL 166 170   High  CM   Comment: .      AGE      DESIRABLE   BORDERLINE HIGH   HIGH     0-19 Y     0 - 169       170 - 199     >/= 200    20-24 Y     0 - 189       190 - 224     >/= 225        >24 Y     0 - 199       200 - 239     >/= 240   **All ranges are based on fasting samples. Specific   therapeutic targets will vary based on patient-specific   cardiac risk.  .   Pediatric guidelines reference:Pediatrics 2011, 128(S5).   Adult guidelines reference: NCEP ATPIII Guidelines,    AMIRA 2001, 258:2486-97  .   Venipuncture immediately after or during the   administration of Metamizole may lead to falsely   low results. Testing should be performed immediately   prior to Metamizole dosing.   HDL mg/dL 44.2 50.0 CM 42.0 CM 47.7 CM   Comment: .      AGE      VERY LOW   LOW     NORMAL    HIGH       0-19 Y       < 35   < 40     40-45     ----    20-24 Y       ----   < 40       >45     ----      >24 Y       ----   < 40     40-60      >60  .   Cholesterol/HDL Ratio  3.8 3.4 CM 4.2 CM 4.4 CM   Comment: REF VALUES  DESIRABLE  < 3.4  HIGH RISK  > 5.0   LDL 0 - 99 mg/dL 106 High  104 High   High   High  CM   Comment: .                           NEAR      BORD      AGE      DESIRABLE  OPTIMAL    HIGH     HIGH     VERY HIGH     0-19 Y     0 - 109     --      ate Specific Antigen, Screen  Order: [id number]  Status: Final result       Visible to patient: No (inaccessible in [facility])    0 Result Notes   important suggestion  Newer results are available. Click to view them now.          Component Ref Range & Units 1 yr ago   Prostate Specific  Antigen,Screen 0.00 - 4.00 ng/mL 3.30   Comment: The FDA requires that the method used for PSA assay be  reported to the physician. Values obtained with different  assay methods must not be used interchangeably. This test  was performed at [facility] using the Access  Hybritech PSA assay is a two-site immunoenzymatic sandwich  assay. The assay is approved for measurement of  prostate-specific antigen (PSA)in serum and may be used  in conjunction with a digital rect      tains abnormal data Prostate Specific Antigen, Screen  Order: [id number]  Status: Final result       Visible to patient: No (inaccessible in [facility])    0 Result Notes        Component Ref Range & Units 2 mo ago 1 yr ago   Prostate Specific Antigen,Screen 0.00 - 4.00 ng/mL 5.29 High  3.30 CM   Comment: The FDA requires that the method used for PSA assay be  reported to the physician. Values obtained with different  assay methods must not      Comprehensive Metabolic Panel  Order: [id number]  Status: Final result       Visible to patient: No (inaccessible in [facility])    0 Result Notes          Component Ref Range & Units 2 mo ago 1 yr ago 2 yr ago 3 yr ago   Glucose 74 - 99 mg/dL 85 88 95 104 High    Sodium 136 - 145 mmol/L 139 139 141 140   Potassium 3.5 - 5.3 mmol/L 4.0 4.2 3.9 4.4   Chloride 98 - 107 mmol/L 106 105 105 105   Bicarbonate 21 - 32 mmol/L 28 29 28 28   Anion Gap 10 - 20 mmol/L 9 Low  9 Low  12 11   Urea Nitrogen 6 - 23 mg/dL 14 16 15 12   Creatinine 0.50 - 1.30 mg/dL 0.92 0.89 0.99 0.92   GFR MALE >90 mL/min/1.73m2 >90      Comment:  CALCULATIONS OF ESTIMATED GFR ARE PERFORMED   USING THE 2021 CKD-EPI STUDY REFIT EQUATION   WITHOUT THE RACE VARIABLE FOR THE IDMS-TRACEABLE   CREATININE METHODS.    https://jasn.asnjournals.org/content/early/2021/09/22/ASN.7203205021   Calcium 8.6 - 10.3 mg/dL 9.1 9.1 9.3 9.3 R   Albumin 3.4 - 5.0 g/dL 4.0 4.3 4.5 4.5   Alkaline Phosphatase 33 - 136 U/L 67 75 91 R 88 R   Total Protein 6.4  - 8.2 g/dL 7.1 6.9 7.2 6.9   AST 9 - 39 U/L 21 21 21 27   Total Bilirubin 0.0 - 1.2 mg/dL 1.0 0.9 0.9 0.7   ALT (SGPT) 10 - 52 U/L 21               Physical Exam  All signs reviewed and normal  Neck neck is all without masses adenopathy bruits or rigidity  Respiratory-there are anteriorly and posteriorly  Cardiovascular-RSR without murmurs gallop or ectopy  Abdominal --no organomegaly mass or rebound no CVA tenderness bowel sounds are normal testicles x2 without nodularity rectal examination shows no digital masses.  Prostate slightly enlarged left lobe approximately is tender right lobe is slightly larger but no nodularity or suspicious masses felt   .peripheral vascular --no symmetric or asymmetric edema no sensorimotor deficits  Prior lab reviewed with elevated PSA and also will LDL.      Assessment/Plan   Problem List Items Addressed This Visit    None  Current symptoms subjectively and  prostate on the left,  we will switch now to Bactrim DS 1 twice a day for 14 days follow-up in 4 weeks we will get PSA and if persistent elevation strongly consider MRI of the prostate-- all question addressed continued moderation of pop caffeine as well as alcohol intake  Continue losartan as well as metoprolol continue low-salt low-fat diet.  imPortance of clinical follow-up and recheck PSA reviewed  @discharge  The above diagnosis and treatment plan was discussed with the patient patient will continue appropriate diet and exercise as reviewed  Patient will recheck earlier if any interval problems of significance or clinical worsening of the above problems.  Agrees above surveillance.  All question were addressed regarding above meds

## 2023-03-21 ASSESSMENT — ENCOUNTER SYMPTOMS
DYSPHORIC MOOD: 0
SHORTNESS OF BREATH: 0
MYALGIAS: 1
FATIGUE: 0
HEMATURIA: 0
FLANK PAIN: 0
APPETITE CHANGE: 0
DIZZINESS: 0
HEADACHES: 0
BACK PAIN: 0
NAUSEA: 0
FEVER: 0
WEAKNESS: 0
DYSURIA: 1
COUGH: 0
ABDOMINAL PAIN: 0
ADENOPATHY: 0
SLEEP DISTURBANCE: 0
BLOOD IN STOOL: 0
PALPITATIONS: 0
FREQUENCY: 1

## 2023-04-19 ENCOUNTER — OFFICE VISIT (OUTPATIENT)
Dept: PRIMARY CARE | Facility: CLINIC | Age: 63
End: 2023-04-19
Payer: COMMERCIAL

## 2023-04-19 VITALS
HEIGHT: 72 IN | OXYGEN SATURATION: 98 % | TEMPERATURE: 96.2 F | SYSTOLIC BLOOD PRESSURE: 144 MMHG | WEIGHT: 214 LBS | HEART RATE: 68 BPM | BODY MASS INDEX: 28.99 KG/M2 | RESPIRATION RATE: 16 BRPM | DIASTOLIC BLOOD PRESSURE: 88 MMHG

## 2023-04-19 DIAGNOSIS — N41.0 ACUTE BACTERIAL PROSTATITIS: ICD-10-CM

## 2023-04-19 DIAGNOSIS — R97.20 ELEVATED PSA MEASUREMENT: Primary | ICD-10-CM

## 2023-04-19 PROCEDURE — 99213 OFFICE O/P EST LOW 20 MIN: CPT | Performed by: FAMILY MEDICINE

## 2023-04-19 PROCEDURE — 3077F SYST BP >= 140 MM HG: CPT | Performed by: FAMILY MEDICINE

## 2023-04-19 PROCEDURE — 3079F DIAST BP 80-89 MM HG: CPT | Performed by: FAMILY MEDICINE

## 2023-04-19 RX ORDER — SULFAMETHOXAZOLE AND TRIMETHOPRIM 800; 160 MG/1; MG/1
1 TABLET ORAL DAILY
Qty: 30 TABLET | Refills: 1 | Status: SHIPPED | OUTPATIENT
Start: 2023-04-19 | End: 2023-05-19

## 2023-04-19 NOTE — PROGRESS NOTES
Subjective   Patient ID: Aman Blanco is a 62 y.o. male who presents for Hypertension (1 month follow up ).  HPI  62-year-old gentleman is here to recheck on his a recent prostatitis he is done better on Bactrim DS and is followed closely with an elevation of his PSA earlier in January.  His voiding has been improved and he is done best on Bactrim agrees to going on suppression dose of 1 daily for 1 month at the end of which time, we will recheck his PSA and consider MRI if persistence  Patient has been off his blood pressure medicines for 2 days and does take losartan 100 mg daily in addition to his 25 mg of metoprolol.  Also takes Flomax which is also helped him voiding.  Otherwise the patient denies any chest pain shortness breath palpitations or new GI- issues.  Again reviewed importance of following up as PSA after treatment with his infection.  Chronic meds reviewed.  No reported side effects.  Importance of compliance reviewed as well as low-salt low-fat diet is also low alcohol diet to help his PSA  Review of Systems   Constitutional:  Negative for fatigue and fever.   Eyes:  Negative for visual disturbance.   Respiratory:  Negative for cough, chest tightness and shortness of breath.    Cardiovascular:  Negative for chest pain, palpitations and leg swelling.   Gastrointestinal:  Negative for abdominal pain and blood in stool.   Genitourinary:  Positive for dysuria, frequency and urgency. Negative for flank pain, hematuria and testicular pain.   Musculoskeletal:  Negative for arthralgias and myalgias.   Skin:  Negative for rash.   Neurological:  Negative for weakness and headaches.   Psychiatric/Behavioral:  Negative for behavioral problems and sleep disturbance.        Objective   /88 (BP Location: Left arm, Patient Position: Sitting, BP Cuff Size: Large adult)   Pulse 68   Temp 35.7 °C (96.2 °F) (Temporal)   Resp 16   Ht 1.829 m (6')   Wt 97.1 kg (214 lb)   SpO2 98%   BMI 29.02 kg/m²    Comprehensive Metabolic Panel  Order: [id number]  Status: Final result       Visible to patient: No (inaccessible in [facility])    0 Result Notes          Component Ref Range & Units 3 mo ago 1 yr ago 2 yr ago 3 yr ago   Glucose 74 - 99 mg/dL 85 88 95 104 High    Sodium 136 - 145 mmol/L 139 139 141 140   Potassium 3.5 - 5.3 mmol/L 4.0 4.2 3.9 4.4   Chloride 98 - 107 mmol/L 106 105 105 105   Bicarbonate 21 - 32 mmol/L 28 29 28 28   Anion Gap 10 - 20 mmol/L 9 Low  9 Low  12 11   Urea Nitrogen 6 - 23 mg/dL 14 16 15 12   Creatinine 0.50 - 1.30 mg/dL 0.92 0.89 0.99 0.92   GFR MALE >90 mL/min/1.73m2 >90      Comment:  CALCULATIONS OF ESTIMATED GFR ARE PERFORMED   USING THE 2021 CKD-EPI STUDY REFIT EQUATION   WITHOUT THE RACE VARIABLE FOR THE IDMS-TRACEABLE   CREATININE METHODS.    https://jasn.asnjournals.org/content/early/2021/09/22/ASN.1930002675   Calcium 8.6 - 10.3 mg/dL 9.1 9.1 9.3 9.3 R   Albumin 3.4 - 5.0 g/dL 4.0 4.3 4.5 4.5   Alkaline Phosphatase 33 - 136 U/L 67 75 91 R 88 R   Total Protein 6.4 - 8.2 g/dL 7.1 6.9 7.2 6.9   AST 9 - 39 U/L 21 21 21 27   Total Bilirubin 0.0 - 1.2 mg/dL 1.0 0.9 0.9 0.7   ALT (SGPT) 10 - 52 U/L 21 23 CM 21 CM 48 CM   Comment:  Patients treated with Sulfasalazine may generate   falsely decreas      Prostate Specific Antigen, Screen  Order: [id number]  Status: Final result       Visible to patient: No (inaccessible in [facility])    0 Result Notes        Component Ref Range & Units 3 mo ago 1 yr ago   Prostate Specific Antigen,Screen 0.00 - 4.00 ng/mL 5.29 High  3.30 CM   Comment: The FDA requires that the method used for PSA assay be  reported to the physician. Values obtained with different  assay methods must not         Order: [id number]  Status: Final result       Visible to patient: No (inaccessible in [facility])    0 Result Notes       1 HM Topic          Component Ref Range & Units 3 mo ago 1 yr ago 2 yr ago 3 yr ago   Cholesterol 0 - 199 mg/dL 166 170   High  CM    Comment: .      AGE      DESIRABLE   BORDERLINE HIGH   HIGH     0-19 Y     0 - 169       170 - 199     >/= 200    20-24 Y     0 - 189       190 - 224     >/= 225        >24 Y     0 - 199       200 - 239     >/= 240   **All ranges are based on fasting samples. Specific   therapeutic targets will vary based on patient-specific   cardiac risk.  .   Pediatric guidelines reference:Pediatrics 2011, 128(S5).   Adult guidelines reference: NCEP ATPIII Guidelines,    AMIRA 2001, 258:2486-97  .   Venipuncture immediately after or during the   administration of Metamizole may lead to falsely   low results. Testing should be performed immediately   prior to Metamizole dosing.   HDL mg/dL 44.2 50.0 CM 42.0 CM 47.7 CM   Comment: .      AGE      VERY LOW   LOW     NORMAL    HIGH       0-19 Y       < 35   < 40     40-45     ----    20-24 Y       ----   < 40       >45     ----      >24 Y       ----   < 40     40-60      >60  .   Cholesterol/HDL Ratio  3.8 3.4 CM 4.2 CM 4.4 CM   Comment: REF VALUES  DESIRABLE  < 3.4  HIGH RISK  > 5.0   LDL 0 - 99 mg/dL 106 High  104 High   High   High  CM   Comment: .              Physical Exam  All signs reviewed.  Blood pressures 148/94 which is off his blood pressure medicines for 3 days  General-inspection of the well-developed well-nourished individual in no acute distress  Neck-neck is supple no masses adenopathy bruits or rigidity thyroid is normal  Respiratory-clear anteriorly and posteriorly  Cardiovascular-RSR without murmurs gallop or ectopy  Abdominal-no organomegaly mass or rebound no suprapubic tenderness no CVA tenderness.  Bowel sounds are normal  Peripheral vascular no symmetric or asymmetric edema no motor sensorivascular deficits.      Assessment/Plan   Problem List Items Addressed This Visit          Genitourinary    Acute bacterial prostatitis   Importance of low-salt diet as well as lower alcohol diet to help his PSA and his blood pressure continue his 2 blood  pressure medicines on a regular basis  We will take suppression dose of Bactrim DS on which he is done best.  He will take 1 daily for another 4 weeks , at which time in 4 weeks we will get a repeat PSA I will recheck him in 5 to 6 weeks.  If psa elevation persists, consider MRI of the prostate and referral.  He agrees to this after lengthy review  Pap caffeine and alcohol in moderation reviewed  @discharge  The above diagnosis and treatment plan was discussed with the patient patient will continue appropriate diet and exercise as reviewed  Patient will recheck earlier if any interval problems of significance or clinical worsening of the above problems.  Agrees above surveillance.  All question were addressed regarding above meds

## 2023-04-23 ASSESSMENT — ENCOUNTER SYMPTOMS
COUGH: 0
FREQUENCY: 1
ARTHRALGIAS: 0
WEAKNESS: 0
FEVER: 0
CHEST TIGHTNESS: 0
DYSURIA: 1
HEMATURIA: 0
FATIGUE: 0
SLEEP DISTURBANCE: 0
PALPITATIONS: 0
MYALGIAS: 0
ABDOMINAL PAIN: 0
FLANK PAIN: 0
SHORTNESS OF BREATH: 0
BLOOD IN STOOL: 0
HEADACHES: 0

## 2023-06-09 ENCOUNTER — LAB (OUTPATIENT)
Dept: LAB | Facility: LAB | Age: 63
End: 2023-06-09
Payer: COMMERCIAL

## 2023-06-09 DIAGNOSIS — R97.20 ELEVATED PSA MEASUREMENT: ICD-10-CM

## 2023-06-09 DIAGNOSIS — N41.0 ACUTE BACTERIAL PROSTATITIS: ICD-10-CM

## 2023-06-09 LAB — PROSTATE SPECIFIC ANTIGEN,SCREEN: 4.5 NG/ML (ref 0–4)

## 2023-06-09 PROCEDURE — 84153 ASSAY OF PSA TOTAL: CPT

## 2023-06-09 PROCEDURE — 36415 COLL VENOUS BLD VENIPUNCTURE: CPT

## 2023-06-19 ENCOUNTER — OFFICE VISIT (OUTPATIENT)
Dept: PRIMARY CARE | Facility: CLINIC | Age: 63
End: 2023-06-19
Payer: COMMERCIAL

## 2023-06-19 VITALS
BODY MASS INDEX: 28.99 KG/M2 | OXYGEN SATURATION: 96 % | HEIGHT: 72 IN | TEMPERATURE: 97.7 F | DIASTOLIC BLOOD PRESSURE: 84 MMHG | WEIGHT: 214 LBS | RESPIRATION RATE: 16 BRPM | SYSTOLIC BLOOD PRESSURE: 136 MMHG | HEART RATE: 61 BPM

## 2023-06-19 DIAGNOSIS — R97.20 ELEVATED PSA MEASUREMENT: ICD-10-CM

## 2023-06-19 DIAGNOSIS — N41.0 ACUTE BACTERIAL PROSTATITIS: ICD-10-CM

## 2023-06-19 LAB
POC APPEARANCE, URINE: CLEAR
POC BILIRUBIN, URINE: NEGATIVE
POC BLOOD, URINE: NEGATIVE
POC COLOR, URINE: YELLOW
POC GLUCOSE, URINE: NEGATIVE MG/DL
POC KETONES, URINE: NEGATIVE MG/DL
POC LEUKOCYTES, URINE: NEGATIVE
POC NITRITE,URINE: NEGATIVE
POC PH, URINE: 5.5 PH
POC PROTEIN, URINE: NEGATIVE MG/DL
POC SPECIFIC GRAVITY, URINE: >=1.03
POC UROBILINOGEN, URINE: 0.2 EU/DL

## 2023-06-19 PROCEDURE — 3079F DIAST BP 80-89 MM HG: CPT | Performed by: FAMILY MEDICINE

## 2023-06-19 PROCEDURE — 81003 URINALYSIS AUTO W/O SCOPE: CPT | Performed by: FAMILY MEDICINE

## 2023-06-19 PROCEDURE — 99213 OFFICE O/P EST LOW 20 MIN: CPT | Performed by: FAMILY MEDICINE

## 2023-06-19 PROCEDURE — 3075F SYST BP GE 130 - 139MM HG: CPT | Performed by: FAMILY MEDICINE

## 2023-06-19 NOTE — PROGRESS NOTES
Subjective   Patient ID: Aman Blanco is a 62 y.o. male who presents for Elevated PSA (2 month follow up ).  HPI  Is an 62-year-old gentleman here to recheck his improved PSA from 5.3 down to 4.5.  No urinary symptoms this time just because of the elevation yet over 4 agrees to getting transrectal ultrasound to clarify  Otherwise remains on his metoprolol and losartan for hypertension  Remains active without exertional chest pain shortness breath or palpitations.  No new GI  symptoms no GI or  red flags after careful review.  Does take Flomax historically but has been off it.  This does help him with his voiding and nocturia.  Review of Systems   Constitutional:  Negative for fatigue and fever.   HENT:  Positive for rhinorrhea. Negative for sinus pressure and sneezing.    Eyes:  Negative for visual disturbance.   Respiratory:  Negative for cough, chest tightness and shortness of breath.    Cardiovascular:  Negative for chest pain, palpitations and leg swelling.   Gastrointestinal:  Negative for abdominal pain and blood in stool.   Genitourinary:  Positive for frequency. Negative for dysuria, flank pain and hematuria.   Musculoskeletal:  Negative for arthralgias and myalgias.   Skin:  Negative for rash.   Neurological:  Negative for weakness and headaches.   Psychiatric/Behavioral:  Negative for behavioral problems and sleep disturbance.        Objective   /84 (BP Location: Right arm, Patient Position: Sitting, BP Cuff Size: Large adult)   Pulse 61   Temp 36.5 °C (97.7 °F) (Temporal)   Resp 16   Ht 1.829 m (6')   Wt 97.1 kg (214 lb)   SpO2 96%   BMI 29.02 kg/m²         t Notes         Component Ref Range & Units 10 d ago 5 mo ago 1 yr ago   Prostate Specific Antigen,Screen 0.00 - 4.00 ng/mL 4.50 High  5.29 High  CM 3.30 CM   Comment: The FDA requires that the method used for PSA assay be  reported to the physician. Values obtained with different  assay methods must not be used interchangeably.  This test  was performed at [facility] using the Access  Hybritech PSA assay is a two-site immunoenzymatic sandwich  assay. The assay is approved for measurement of  prostate-specific antigen (PSA)in serum and may be used  in conjunction with a digital rectal examination in men  50 years and older as an aid in detection of prostate  cancer.  5-Alpha-reductase inhibitors (e.g. Proscar, Finasteride,  Avodart, Dutasteride and Belinda) for the treatment of BPH  have been shown to lower PSA levels by an average of 50%  after 6 months of treatment.   Resulting Agency  [facility]      ains abnormal data Prostate Specific Antigen, Screen  Order: [id number]  Status: Final result       Visible to patient: Yes (not seen)    0 Result Notes   important suggestion  Newer results are available. Click to view them now.           Component Ref Range & Units 5 mo ago 1 yr ago   Prostate Specific Antigen,Screen 0.00 - 4.00 ng/mL 5.29 High  3.30 CM   Comment: The FDA requires that the method used for PSA assay be  reported to the physician. Values obtained with different  assay methods must not be used interchangeably. This test  was performed at [facility] using the Access  Hybritech PSA assay is a two-site immunoenzymatic sandwich  assay. The assay is approved for measurement of  prostate-specific antigen (PSA)in serum and may be used  in conjunction with a digital              Specimen Collected: 06/09/23 09:17         Lipid Panel  Order: [id number]  Status: Final result       Visible to patient: No (inaccessible in [facility])    0 Result Notes       1 HM Topic          Component Ref Range & Units 5 mo ago 1 yr ago 2 yr ago 3 yr ago   Cholesterol 0 - 199 mg/dL 166 170   High  CM   Comment: .      AGE      DESIRABLE   BORDERLINE HIGH   HIGH     0-19 Y     0 - 169       170 - 199     >/= 200    20-24 Y     0 - 189       190 - 224     >/= 225        >24 Y     0 -  199       200 - 239     >/= 240   **All ranges are based on fasting samples. Specific   therapeutic targets will vary based on patient-specific   cardiac risk.  .   Pediatric guidelines reference:Pediatrics 2011, 128(S5).   Adult guidelines reference: NCEP ATPIII Guidelines,    AMIRA 2001, 258:2486-97  .   Venipuncture immediately after or during the   administration of Metamizole may lead to falsely   low results. Testing should be performed immediately   prior to Metamizole dosing.   HDL mg/dL 44.2 50.0 CM 42.0 CM 47.7 CM   Comment: .      AGE      VERY LOW   LOW     NORMAL    HIGH       0-19 Y       < 35   < 40     40-45     ----    20-24 Y       ----   < 40       >45     ----      >24 Y       ----   < 40     40-60      >60  .   Cholesterol/HDL Ratio  3.8 3.4 CM 4.2 CM 4.4 CM   Comment: REF VALUES  DESIRABLE  < 3.4  HIGH RISK  > 5.0   LDL 0 - 99 mg/dL 106 High  104 High   High   High  CM   Comment: .                           NEAR      BORD      AGE      DESIRABLE  OPTIMAL    HIGH     HIGH     VERY HIGH     0-19 Y     0 - 109     ---    110-129   >/= 130     ----    20-24 Y     0 - 119     ---    120-159   >/= 160     ----      >24 Y     0 -  99   100-129  130-159   160-189     >/=190  .       Contains abnormal data Comprehensive Metabolic Panel  Order: [id number]  Status: Final result       Visible to patient: No (inaccessible in [facility])    0 Result Notes          Component Ref Range & Units 5 mo ago 1 yr ago 2 yr ago 3 yr ago   Glucose 74 - 99 mg/dL 85 88 95 104 High    Sodium 136 - 145 mmol/L 139 139 141 140   Potassium 3.5 - 5.3 mmol/L 4.0 4.2 3.9 4.4   Chloride 98 - 107 mmol/L 106 105 105 105   Bicarbonate 21 - 32 mmol/L 28 29 28 28   Anion Gap 10 - 20 mmol/L 9 Low  9 Low  12 11   Urea Nitrogen 6 - 23 mg/dL 14 16 15 12   Creatinine 0.50 - 1.30 mg/dL 0.92 0.89 0.99 0.92   GFR MALE >90 mL/min/1.73m2 >90      Comment:  CALCULATIONS OF ESTIMATED GFR ARE PERFORMED   USING THE 2021 CKD-EPI STUDY REFIT  EQUATION   WITHOUT THE RACE VARIABLE FOR THE IDMS-TRACEABLE   CREATININE METHODS.    https://jasn.asnjournals.org/content/early/2021/09/22/ASN.8744808905   Calcium 8.6 - 10.3 mg/dL 9.1 9.1 9.3 9.3 R   Albumin 3.4 - 5.0 g/dL 4.0 4.3 4.5 4.5   Alkaline Phosphatase 33 - 136 U/L 67 75 91 R 88 R   Total Protein 6.4 - 8.2 g/dL 7.1 6.9 7.2 6.9   AST 9 - 39 U/L 21 21 21 27   Total Bilirubin 0.0 - 1.2 mg/dL 1.0 0.9 0.9 0.7   ALT (SGPT) 10 - 52 U/L 21 23 CM 21 CM 48 CM   Comment:  Patients treated with Sulfasalazine may generate   falsely decreased results for ALT.   Resulting Agency  [facility]                   Physical Exam  All signs reviewed and normal  General inspection well-developed well-nourished individual in no distress  Neck neck is supple no masses adenopathy bruits rigidity thyroid normal  Respiratory-chest clear anteriorly and posteriorly  Cardiovascular-RSR without murmurs gallop or ectopy  Abdominal no organomegaly mass or rebound no suprapubic or CVA tenderness peripheral vascular --with good pulses no deficits.  No edema plan above reviewed normal CMP and improving PSA.-normal cholesterols      Assessment/Plan   Problem List Items Addressed This Visit          Genitourinary    Acute bacterial prostatitis     Other Visit Diagnoses       Elevated PSA measurement            PSA improved symptoms on Cipro.  We will get a transrectal ultrasound and proceed with MRI if any question.  Happy his PSA went down we will continue his 2 [facility]hypertensive medicines [facility]  Follow-up in 3 months we will call with above results all questions were addressed no GI  red flags at this time continue on a low-salt and low-fat diet  @discharge  The above diagnosis and treatment plan was discussed with the patient patient will continue appropriate diet and exercise as reviewed  Patient will recheck earlier if any interval problems of  significance or clinical worsening of the above problems.  Agrees above surveillance.  All question were addressed regarding above meds

## 2023-06-22 ASSESSMENT — ENCOUNTER SYMPTOMS
WEAKNESS: 0
FEVER: 0
SLEEP DISTURBANCE: 0
DYSURIA: 0
HEADACHES: 0
COUGH: 0
FLANK PAIN: 0
FREQUENCY: 1
ARTHRALGIAS: 0
SHORTNESS OF BREATH: 0
FATIGUE: 0
PALPITATIONS: 0
BLOOD IN STOOL: 0
RHINORRHEA: 1
CHEST TIGHTNESS: 0
HEMATURIA: 0
ABDOMINAL PAIN: 0
SINUS PRESSURE: 0
MYALGIAS: 0

## 2023-09-26 ENCOUNTER — OFFICE VISIT (OUTPATIENT)
Dept: PRIMARY CARE | Facility: CLINIC | Age: 63
End: 2023-09-26
Payer: COMMERCIAL

## 2023-09-26 VITALS
RESPIRATION RATE: 16 BRPM | BODY MASS INDEX: 28.58 KG/M2 | SYSTOLIC BLOOD PRESSURE: 122 MMHG | DIASTOLIC BLOOD PRESSURE: 70 MMHG | TEMPERATURE: 96.6 F | HEART RATE: 64 BPM | WEIGHT: 211 LBS | HEIGHT: 72 IN | OXYGEN SATURATION: 97 %

## 2023-09-26 DIAGNOSIS — I10 PRIMARY HYPERTENSION: ICD-10-CM

## 2023-09-26 DIAGNOSIS — N41.0 ACUTE BACTERIAL PROSTATITIS: ICD-10-CM

## 2023-09-26 DIAGNOSIS — E78.5 DYSLIPIDEMIA: ICD-10-CM

## 2023-09-26 DIAGNOSIS — N40.2 PROSTATIC NODULE: Primary | ICD-10-CM

## 2023-09-26 DIAGNOSIS — R97.20 ELEVATED PSA MEASUREMENT: ICD-10-CM

## 2023-09-26 PROCEDURE — 3078F DIAST BP <80 MM HG: CPT | Performed by: FAMILY MEDICINE

## 2023-09-26 PROCEDURE — 3074F SYST BP LT 130 MM HG: CPT | Performed by: FAMILY MEDICINE

## 2023-09-26 PROCEDURE — 99213 OFFICE O/P EST LOW 20 MIN: CPT | Performed by: FAMILY MEDICINE

## 2023-09-26 RX ORDER — METOPROLOL SUCCINATE 25 MG/1
25 TABLET, EXTENDED RELEASE ORAL DAILY
Qty: 90 TABLET | Refills: 3 | Status: SHIPPED | OUTPATIENT
Start: 2023-09-26

## 2023-09-26 RX ORDER — TAMSULOSIN HYDROCHLORIDE 0.4 MG/1
0.4 CAPSULE ORAL 2 TIMES DAILY
Qty: 180 CAPSULE | Refills: 3 | Status: SHIPPED | OUTPATIENT
Start: 2023-09-26

## 2023-09-26 RX ORDER — LOSARTAN POTASSIUM 100 MG/1
100 TABLET ORAL DAILY
Qty: 90 TABLET | Refills: 3 | Status: SHIPPED | OUTPATIENT
Start: 2023-09-26

## 2023-09-26 NOTE — PROGRESS NOTES
Subjective   Patient ID: Aman Blanco is a 63 y.o. male who presents for Elevated PSA (3 month follow up ).  HPI  63-year-old gentleman here to recheck his ultrasound.  He did have a small nodule between the central and peripheral zone in the left lobe of the prostate but his PSA went from 5.2 down to 4.5.  His urine was good and to help clarify this we did discuss coming in to get an MRI order he is here to get the MRI order he has no dysuria flank pain hematuria or other worrisome symptoms  Does take losartan metoprolol for hypertension Flomax but did notice some benefit.  Importance of clarifying the nodule was reviewed today and suggested a urology referral earlier declined and preferred to recheck x-ray before referral so that it is no time to do the MRI  Otherwise denies any chest pain shortness breath palpitations no new GI/ issues.  Chronic meds reviewed no reported side effects.  Review of Systems   Constitutional:  Negative for fatigue and fever.   Eyes:  Negative for visual disturbance.   Respiratory:  Negative for cough, chest tightness and shortness of breath.    Cardiovascular:  Negative for chest pain, palpitations and leg swelling.   Gastrointestinal:  Negative for abdominal pain and blood in stool.   Genitourinary:  Positive for frequency. Negative for difficulty urinating, dysuria, hematuria, testicular pain and urgency.   Musculoskeletal:  Negative for arthralgias and myalgias.   Skin:  Negative for rash.   Neurological:  Negative for weakness and headaches.   Psychiatric/Behavioral:  Negative for behavioral problems and sleep disturbance.        Objective   /70 (BP Location: Left arm, Patient Position: Sitting, BP Cuff Size: Large adult)   Pulse 64   Temp 35.9 °C (96.6 °F) (Temporal)   Resp 16   Ht 1.829 m (6')   Wt 95.7 kg (211 lb)   SpO2 97%   BMI 28.62 kg/m²   n 6/19/2023 09:55     POCT UA Automated manually resulted  Order: [id number]  Status: Final result       Visible to  patient: Yes (seen)       Next appt: None       Dx: Acute bacterial prostatitis; Elevated...    2 Result Notes      Component  Ref Range & Units 3 mo ago   POC Color, Urine  Straw, Yellow, Light Yellow Yellow   POC Appearance, Urine  Clear Clear   POC Specific Gravity, Urine  1.005 - 1.035 >=1.030   POC PH, Urine  No Reference Range Established PH 5.5   POC Protein, Urine  NEGATIVE, 30 (1+) mg/dl NEGATIVE   POC Glucose, Urine  NEGATIVE mg/dl NEGATIVE   POC Blood, Urine  NEGATIVE NEGATIVE   POC Ketones, Urine  NEGATIVE mg/dl NEGATIVE   POC Bilirubin, Urine  NEGATIVE NEGATIVE   POC Urobilinogen, Urine  0.2, 1.0 EU/DL 0.2   Poc Nitrate, Urine  NEGATIVE NEGATIVE   POC Leukocytes, Urine  NEGATIVE NEGATIVE                       rostate Spec.Ag,Screen  Order: [id number]  Status: Final result       Visible to patient: Yes (seen)       Dx: Acute bacterial prostatitis; Elevated...    2 Result Notes        Component  Ref Range & Units 3 mo ago 8 mo ago 1 yr ago   Prostate Specific Antigen,Screen  0.00 - 4.00 ng/mL 4.50 High  5.29 High  CM 3.30 CM   Comment: The FDA requires that the method used for PSA assay be  reported to the physician. Values obtained with different  assay methods must not be used interchangeably. This test  was performed at [facility] using the Access  Hybritech PSA assay is a two-site immunoenzymatic sandwich  assay. The assay is approved for measurement of  prostate-specific antigen (PSA)in serum and may be used  in conjunction with a digital rectal examination in men  50 years and older as an aid in detection of prostate  cancer.  5-Alpha-reductase inhibitors (e.g. Proscar, Finasteride,  Avodart, Dutasteride and Belinda) for the treatment of BPH  have been shown to lower PSA lev        Narrative & Impression   Interpreted By:  JULIUS HINDS MD  MRN: [id number]  Patient Name: MICK VARGAS     STUDY:  ULTRASOUND TRANSRECTAL; ;  6/21/2023 10:08 am     INDICATION:  elevated psa/bph.      COMPARISON:  None.     ACCESSION NUMBER(S):  [id number]     ORDERING CLINICIAN:  JAIME LOMBARDO     TECHNIQUE:  Transrectal grayscale and color Doppler ultrasound evaluation of the  prostate.     FINDINGS:  The prostate measures 6.2 x 4.9 x 5.0 cm (volume of 79 cc). Calcific  changes are noted throughout the central gland. There is an  oval-shaped hypoechoic nodule at the junction of the central gland  and peripheral zone in the left paramidline aspect of the prostate  measuring up to 0.7 cm (images 39-42 of 42); this is suspected near  the level of the prostatic apex. Seminal vesicles have a symmetric  appearance.     IMPRESSION:  Prostate hypertrophy.     0.7 cm hypoechoic nodule at the junction of the peripheral zone and  central gland toward the left paramidline aspect of the apex, as  described above; contrast-enhanced MRI of the prostate is recommended  for further evaluation.     Result History        Physical Exam  Vital signs reviewed and normal  Neck neck is supple no masses adenopathy bruits or rigidity thyroid is normal  Chest-chest is clear without wheezing rales or rhonchi  Cardiovascular-without murmurs gallop or ectopy  Abdominal no organomegaly mass rebound no CVA tenderness bowel sounds are normal no scrotal pain no hernias  Peripheral vascular no symmetric or asymmetric edema no sensorimotor deficits  Reduction in PSA this summer was reviewed but still small nodule on ultrasound and will get the MRI as we promised him.  We will      Assessment/Plan   Problem List Items Addressed This Visit       Acute bacterial prostatitis    Dyslipidemia    HTN (hypertension)     Other Visit Diagnoses       Elevated PSA measurement            Mention MRI of the prostate to clarify nodule the left lobe  Renew his chronic medicines  We will check CBC chemistry and lipid panel continue healthy routine as well as low-salt low-fat diet we will call in her MRI return and certainly any question we will refer to urology to  which she concurs.  @discharge  The above diagnosis and treatment plan was discussed with the patient patient will continue appropriate diet and exercise as reviewed  Patient will recheck earlier if any interval problems of significance or clinical worsening of the above problems.  Agrees above surveillance.  All question were addressed regarding above meds

## 2023-10-01 PROBLEM — R97.20 ELEVATED PSA MEASUREMENT: Status: ACTIVE | Noted: 2023-10-01

## 2023-10-01 PROBLEM — N40.2 PROSTATIC NODULE: Status: ACTIVE | Noted: 2023-10-01

## 2023-10-01 ASSESSMENT — ENCOUNTER SYMPTOMS
COUGH: 0
FEVER: 0
DYSURIA: 0
DIFFICULTY URINATING: 0
HEMATURIA: 0
SHORTNESS OF BREATH: 0
WEAKNESS: 0
BLOOD IN STOOL: 0
HEADACHES: 0
CHEST TIGHTNESS: 0
FATIGUE: 0
FREQUENCY: 1
ARTHRALGIAS: 0
ABDOMINAL PAIN: 0
PALPITATIONS: 0
MYALGIAS: 0
SLEEP DISTURBANCE: 0

## 2023-10-05 ENCOUNTER — HOSPITAL ENCOUNTER (OUTPATIENT)
Dept: RADIOLOGY | Facility: HOSPITAL | Age: 63
Discharge: HOME | End: 2023-10-05
Payer: COMMERCIAL

## 2023-10-05 DIAGNOSIS — R97.20 ELEVATED PSA MEASUREMENT: ICD-10-CM

## 2023-10-05 DIAGNOSIS — N40.2 PROSTATIC NODULE: ICD-10-CM

## 2023-10-05 PROCEDURE — 2550000001 HC RX 255 CONTRASTS: Performed by: FAMILY MEDICINE

## 2023-10-05 PROCEDURE — A9575 INJ GADOTERATE MEGLUMI 0.1ML: HCPCS | Performed by: FAMILY MEDICINE

## 2023-10-05 PROCEDURE — 72197 MRI PELVIS W/O & W/DYE: CPT | Performed by: RADIOLOGY

## 2023-10-05 PROCEDURE — 72197 MRI PELVIS W/O & W/DYE: CPT

## 2023-10-05 RX ORDER — GADOTERATE MEGLUMINE 376.9 MG/ML
0.2 INJECTION INTRAVENOUS
Status: COMPLETED | OUTPATIENT
Start: 2023-10-05 | End: 2023-10-05

## 2023-10-05 RX ADMIN — GADOTERATE MEGLUMINE 19 ML: 376.9 INJECTION INTRAVENOUS at 11:20

## 2023-10-08 DIAGNOSIS — K60.3 TRANSSPHINCTERIC ANAL FISTULA: Primary | ICD-10-CM

## 2023-10-08 RX ORDER — CIPROFLOXACIN 500 MG/1
500 TABLET ORAL 2 TIMES DAILY
Qty: 14 TABLET | Refills: 0 | Status: SHIPPED | OUTPATIENT
Start: 2023-10-08 | End: 2023-10-15

## 2023-10-08 RX ORDER — METRONIDAZOLE 500 MG/1
500 TABLET ORAL 2 TIMES DAILY
Qty: 14 TABLET | Refills: 0 | Status: SHIPPED | OUTPATIENT
Start: 2023-10-08 | End: 2023-10-15

## 2024-06-28 ENCOUNTER — APPOINTMENT (OUTPATIENT)
Dept: PRIMARY CARE | Facility: CLINIC | Age: 64
End: 2024-06-28
Payer: COMMERCIAL

## 2024-06-28 VITALS
SYSTOLIC BLOOD PRESSURE: 122 MMHG | BODY MASS INDEX: 28.71 KG/M2 | DIASTOLIC BLOOD PRESSURE: 80 MMHG | HEIGHT: 72 IN | TEMPERATURE: 97.5 F | HEART RATE: 61 BPM | WEIGHT: 212 LBS | OXYGEN SATURATION: 96 % | RESPIRATION RATE: 16 BRPM

## 2024-06-28 DIAGNOSIS — K60.2 FISSURE IN ANO: ICD-10-CM

## 2024-06-28 DIAGNOSIS — N41.0 ACUTE BACTERIAL PROSTATITIS: Primary | ICD-10-CM

## 2024-06-28 LAB
POC APPEARANCE, URINE: CLEAR
POC BILIRUBIN, URINE: NEGATIVE
POC BLOOD, URINE: NEGATIVE
POC COLOR, URINE: YELLOW
POC GLUCOSE, URINE: NEGATIVE MG/DL
POC KETONES, URINE: NEGATIVE MG/DL
POC LEUKOCYTES, URINE: ABNORMAL
POC NITRITE,URINE: NEGATIVE
POC PH, URINE: 6.5 PH
POC PROTEIN, URINE: NEGATIVE MG/DL
POC SPECIFIC GRAVITY, URINE: 1.01
POC UROBILINOGEN, URINE: 0.2 EU/DL

## 2024-06-28 PROCEDURE — 3074F SYST BP LT 130 MM HG: CPT | Performed by: FAMILY MEDICINE

## 2024-06-28 PROCEDURE — 99213 OFFICE O/P EST LOW 20 MIN: CPT | Performed by: FAMILY MEDICINE

## 2024-06-28 PROCEDURE — 3079F DIAST BP 80-89 MM HG: CPT | Performed by: FAMILY MEDICINE

## 2024-06-28 PROCEDURE — 81003 URINALYSIS AUTO W/O SCOPE: CPT | Performed by: FAMILY MEDICINE

## 2024-06-28 RX ORDER — CIPROFLOXACIN 500 MG/1
500 TABLET ORAL
Qty: 28 TABLET | Refills: 0 | Status: SHIPPED | OUTPATIENT
Start: 2024-06-28 | End: 2024-07-12

## 2024-06-28 RX ORDER — METRONIDAZOLE 500 MG/1
500 TABLET ORAL 2 TIMES DAILY
Qty: 14 TABLET | Refills: 0 | Status: SHIPPED | OUTPATIENT
Start: 2024-06-28 | End: 2024-07-05

## 2024-06-28 NOTE — PROGRESS NOTES
Subjective   Patient ID: Aman Blanco is a 63 y.o. male who presents for Acute bacterial prostatitis (With urinary frequency).  HPI  63-year-old gentleman is here to recheck interval development of symptoms reminiscent of his earlier prostatitis i.e. increased scrotal pain bladder fullness increased urinary frequency both in the daytime and nighttime noticed no significant hematuria flank pain but no fever chills and only minimal dysuria patient failed to get his PSA chemistry and lipid panel last September we will need to get this drawn before next visit  Otherwise no high fever no history of renal calculi gross hematuria but more achiness in his perineal area.  Also has history of perianal fistula fistula which is causing some discomfort otherwise no sniffing and drainage no dyschezia no bloody stool at this time no significant change in bowel habits no abdominal distention abdominal pain bloating vomiting nausea or significant weight loss.  His losartan 100 mg daily for hypertension as well as metoprolol at night at 25 mg daily.  Remain on his Flomax which definitely helped his voiding after supper nightly.  Review of Systems   Constitutional:  Negative for fatigue, fever and unexpected weight change.   Eyes:  Negative for visual disturbance.   Respiratory:  Negative for cough, chest tightness and shortness of breath.    Cardiovascular:  Negative for chest pain, palpitations and leg swelling.   Gastrointestinal:  Positive for rectal pain. Negative for abdominal pain, anal bleeding, blood in stool, diarrhea, nausea and vomiting.   Genitourinary:  Positive for dysuria, frequency, testicular pain and urgency. Negative for flank pain, hematuria and penile discharge.   Musculoskeletal:  Positive for myalgias. Negative for arthralgias.   Skin:  Negative for rash.   Neurological:  Negative for weakness, light-headedness and headaches.   Psychiatric/Behavioral:  Negative for behavioral problems, confusion, sleep  disturbance and suicidal ideas.        Objective   /80 (BP Location: Left arm, Patient Position: Sitting, BP Cuff Size: Large adult)   Pulse 61   Temp 36.4 °C (97.5 °F) (Temporal)   Resp 16   Ht 1.829 m (6')   Wt 96.2 kg (212 lb)   SpO2 96%   BMI 28.75 kg/m²   MR prostate  Status: Final result     PACS Images     Show images for MR prostate  Signed by    Signed Time Phone Pager   Kane Demarco MD 10/05/2023 13:18 [phone]     Exam Information    Status Exam Begun Exam Ended   Final 10/05/2023 10:34 10/05/2023 11:19     Study Result    Narrative   Interpreted By:  Kane Rojo,  and Richard Hodgson  STUDY:  MR PROSTATE;  10/5/2023 11:19 am      INDICATION:  Signs/Symptoms:left lobe .7cm nodule/US; previous elevated PSA.  63-year-old male underwent a transrectal ultrasound for elevated  PSA/BPH on 06/21/2023 which demonstrated a nodule. MRI requested for  further evaluation.      PSA values  4.5 on 06/09/2023  5.29 on 01/05/2023  3.30 on 12/02/2021  3.70 on 12/10/2020  3.15 on 12/10/2019      COMPARISON:  Transrectal ultrasound 06/21/2023      ACCESSION NUMBER(S):  [id number]      ORDERING CLINICIAN:  JAIME LOMBARDO      TECHNIQUE:  Multiplanar MRI of the pelvis was obtained including axial, sagittal  and coronal T2 weighted SSFSE, axial and sagittal T2 FSE, axial DWI,  pre and post gadolinium dynamic T1 GRE sequences. Multiparametric  analysis was performed.      19 mL Dotarem gadolinium based contrast was administered  intravenously without immediate complications.      FINDINGS:  PROSTATE VOLUME:  The prostate measures  5.4 cm x  5.0 cm  x  6.1 cm in right-to-left,  anterior-posterior and craniocaudal dimension.      Prostate weight is estimated at 86.2g. PSA density is 0.05 ng/mL/g.      PROSTATE PARENCHYMA:  Prominence of the median lobe causing indentation over the bladder  floor. There are BPH changes of the transition zone noted. The  peripheral zone is  diffusely heterogenous on T2WI, with bilateral  polygonal and wedge-shaped T2-hypointense areas, that show no signs  of abnormally restricted diffusion (PI-RADS 2).      EXTRACAPSULAR EXTENSION:  None.      SEMINAL VESICLES:  Within normal limits.      PELVIC LYMPH NODES:  No abnormally enlarged pelvic lymph nodes are identified.      PERITONEUM:  No free or loculated fluid collections are evident in the pelvis.      OTHER ORGANS:  There is mild circumferential thickening of the bladder, with  prominent trabeculation, likely sequela of chronic outlet obstruction.      There is a linear tract consistent with an intersphincteric perianal  fistula, with internal orifice in the right lateral aspect of the  anal canal (9 o'clock position) approximately 4.6 cm from the  ano-cutaneous transition, coursing along adjacent intersphincteric  fat and draining apparently in the right gluteal cleft. There is T2  hyperintensity and contrast enhancement along this fistulous tract,  consistent with inflammatory activity. No associated fluid  collections or secondary tracts were identified.      BONES:  No focal lesions are noted in the bone.       Impression   1.  BPH changes of the transition zone. Diffuse non nodular  hypointensities within the peripheral zone, without evidence of  focally restricted diffusion ( PI-RADS 2).  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             CT UA Automated manually resulted (Order [id number])  All Reviewers List    Lucia Small MA on 7/1/2024 08:31   Braydon Kelsey DO on 6/30/2024 20:33      Contains abnormal data POCT UA Automated manually resulted  Order: [id number]   Status: Final result       Visible to patient: Yes (seen)       Next appt: 07/19/2024 at 11:00 AM in Primary [facility] (Braydon Kelsey DO)       Dx: Acute bacterial prostatitis    2 Result Notes       Component  Ref Range & Units 4 d ago 1 yr ago   POC Color, Urine  Straw, Yellow, Light-Yellow Yellow Yellow R   POC  Appearance, Urine  Clear Clear Clear   POC Glucose, Urine  NEGATIVE mg/dl NEGATIVE NEGATIVE   POC Bilirubin, Urine  NEGATIVE NEGATIVE NEGATIVE   POC Ketones, Urine  NEGATIVE mg/dl NEGATIVE NEGATIVE   POC Specific Gravity, Urine  1.005 - 1.035 1.015 >=1.030   POC Blood, Urine  NEGATIVE NEGATIVE NEGATIVE   POC PH, Urine  No Reference Range Established PH 6.5 5.5   POC Protein, Urine  NEGATIVE, 30 (1+) mg/dl NEGATIVE NEGATIVE   POC Urobilinogen, Urine  0.2, 1.0 EU/DL 0.2 0.2   Poc Nitrite, Urine  NEGATIVE NEGATIVE NEGATIVE   POC Leukocytes, Urine  NEGATIVE TRACE Abnormal  NEGATIVE                 Metabolic Panel  Order: [id number]   Status: Final result       Visible to patient: Yes (not seen)    0 Result Notes         Component  Ref Range & Units 1 yr ago 2 yr ago 3 yr ago 4 yr ago   Glucose  74 - 99 mg/dL 85 88 95 104 High    Sodium  136 - 145 mmol/L 139 139 141 140   Potassium  3.5 - 5.3 mmol/L 4.0 4.2 3.9 4.4   Chloride  98 - 107 mmol/L 106 105 105 105   Bicarbonate  21 - 32 mmol/L 28 29 28 28   Anion Gap  10 - 20 mmol/L 9 Low  9 Low  12 11   Urea Nitrogen  6 - 23 mg/dL 14 16 15 12   Creatinine  0.50 - 1.30 mg/dL 0.92 0.89 0.99 0.92   GFR MALE  >90 mL/min/1.73m2 >90      Comment:  CALCULATIONS OF ESTIMATED GFR ARE PERFORMED   USING THE 2021 CKD-EPI STUDY REFIT EQUATION   WITHOUT THE RACE VARIABLE FOR THE IDMS-TRACEABLE   CREATININE METHODS.    https://jasn.asnjournals.org/content/early/2021/09/22/ASN.9347268256   Calcium  8.6 - 10.3 mg/dL 9.1 9.1 9.3 9.3 R   Albumin  3.4 - 5.0 g/dL 4.0 4.3 4.5 4.5   Alkaline Phosphatase  33 - 136 U/L 67 75 91 R 88 R   Total Protein  6.4 - 8.2 g/dL 7.1 6.9 7.2 6.9   AST  9 - 39 U/L 21 21 21 27   Total Bilirubin  0.0 - 1.2 mg/dL 1.0 0.9 0.9 0.7   ALT (SGPT)  10 - 52 U/L 21               Physical Exam  Vital sign review normal  General inspection reveals pleasant or active individual in no acute distress  Neck neck is supple without mass adenopathy bruits rigidity  Pulmonary-chest clear  anteriorly posteriorly  Cardiovascular RSR without murmurs gallop or ectopy  Abdominal no organomegaly mass rebound no CVA tenderness some suprapubic fullness otherwise no inguinal hernias testicles x 2 without tenderness perianal area shows tender at 9 o'clock position without significant drainage but punctate fistula opening no exudate is seen 1 external hemorrhoid seen but otherwise negative digital rectal exam today for tenderness.  However there is definite pain in the left medial prostate area  Vascular no symmetric or asymmetric edema  Urinalysis shows trace leukocytes but otherwise negative for blood nitrates.      Assessment/Plan   Problem List Items Addressed This Visit       Acute bacterial prostatitis    Relevant Orders    POCT UA Automated manually resulted   Urine with pyuria as well as a urinary symptoms reminiscent of recurrent prostatitis will place on Cipro 500 mg twice a day for 14 days in addition to Flagyl to help both prostate and prevent antibiotic induced diarrhea and also to help with some symptoms of perianal fistula he is aware of the importance of getting his laboratory in September drawn before I see him in 2 to 3 weeks increasing liquids moderation and pop caffeine and alcohol to be observed importance of liquid regular voiding continue his Flomax before or after supper  Importance of clinical follow-up  PSA but exam in 2 to 3 weeks reviewed with the patient call interval problems  @discharge  The above diagnosis and treatment plan was discussed with the patient patient will continue appropriate diet and exercise as reviewed  Patient will recheck earlier if any interval problems of significance or clinical worsening of the above problems.  Agrees above surveillance.  All question were addressed regarding above meds

## 2024-07-02 PROBLEM — K60.2 FISSURE IN ANO: Status: ACTIVE | Noted: 2024-07-02

## 2024-07-02 ASSESSMENT — ENCOUNTER SYMPTOMS
COUGH: 0
FLANK PAIN: 0
FEVER: 0
HEMATURIA: 0
FREQUENCY: 1
ABDOMINAL PAIN: 0
PALPITATIONS: 0
SHORTNESS OF BREATH: 0
DIARRHEA: 0
WEAKNESS: 0
SLEEP DISTURBANCE: 0
HEADACHES: 0
MYALGIAS: 1
UNEXPECTED WEIGHT CHANGE: 0
CONFUSION: 0
LIGHT-HEADEDNESS: 0
NAUSEA: 0
BLOOD IN STOOL: 0
FATIGUE: 0
VOMITING: 0
ANAL BLEEDING: 0
RECTAL PAIN: 1
ARTHRALGIAS: 0
CHEST TIGHTNESS: 0
DYSURIA: 1

## 2024-07-17 ENCOUNTER — OFFICE VISIT (OUTPATIENT)
Dept: PRIMARY CARE | Facility: CLINIC | Age: 64
End: 2024-07-17
Payer: COMMERCIAL

## 2024-07-17 VITALS
WEIGHT: 210 LBS | OXYGEN SATURATION: 98 % | DIASTOLIC BLOOD PRESSURE: 82 MMHG | SYSTOLIC BLOOD PRESSURE: 130 MMHG | TEMPERATURE: 96.4 F | RESPIRATION RATE: 16 BRPM | BODY MASS INDEX: 28.44 KG/M2 | HEART RATE: 64 BPM | HEIGHT: 72 IN

## 2024-07-17 DIAGNOSIS — I10 PRIMARY HYPERTENSION: Primary | ICD-10-CM

## 2024-07-17 DIAGNOSIS — N41.0 ACUTE BACTERIAL PROSTATITIS: ICD-10-CM

## 2024-07-17 LAB
POC APPEARANCE, URINE: CLEAR
POC BILIRUBIN, URINE: NEGATIVE
POC BLOOD, URINE: NEGATIVE
POC COLOR, URINE: YELLOW
POC GLUCOSE, URINE: NEGATIVE MG/DL
POC KETONES, URINE: NEGATIVE MG/DL
POC LEUKOCYTES, URINE: NEGATIVE
POC NITRITE,URINE: NEGATIVE
POC PH, URINE: 5.5 PH
POC PROTEIN, URINE: NEGATIVE MG/DL
POC SPECIFIC GRAVITY, URINE: 1.02
POC UROBILINOGEN, URINE: 0.2 EU/DL

## 2024-07-17 PROCEDURE — 3075F SYST BP GE 130 - 139MM HG: CPT | Performed by: FAMILY MEDICINE

## 2024-07-17 PROCEDURE — 3079F DIAST BP 80-89 MM HG: CPT | Performed by: FAMILY MEDICINE

## 2024-07-17 PROCEDURE — 99213 OFFICE O/P EST LOW 20 MIN: CPT | Performed by: FAMILY MEDICINE

## 2024-07-17 PROCEDURE — 81003 URINALYSIS AUTO W/O SCOPE: CPT | Performed by: FAMILY MEDICINE

## 2024-07-17 PROCEDURE — 87086 URINE CULTURE/COLONY COUNT: CPT

## 2024-07-17 PROCEDURE — 3008F BODY MASS INDEX DOCD: CPT | Performed by: FAMILY MEDICINE

## 2024-07-17 RX ORDER — LEVOFLOXACIN 500 MG/1
500 TABLET, FILM COATED ORAL
Qty: 14 TABLET | Refills: 0 | Status: SHIPPED | OUTPATIENT
Start: 2024-07-17 | End: 2024-07-31

## 2024-07-17 NOTE — PROGRESS NOTES
Subjective   Patient ID: Aman Blanco is a 63 y.o. male who presents for Acute bacterial prostatitis (2 week follow up ).  HPI  63-year-old gentleman is here to review his recent prostatitis last autumn had a normal MRI compatible with BPH and had returning symptoms of an signs of infection roughly 2-1/2 weeks ago to 3 weeks ago felt like he voided better on a combination of Flomax and Cipro twice a day still has some post voiding discomfort fullness but otherwise voiding has improved no flank pain and testicular pain is markedly improved no hematuria.  Still has stop and go with his voiding and nocturia has improved.  Does take 2 antihypertensive medicines but no recent chest pain shortness of breath or palpitations.    Review of Systems   Constitutional:  Negative for fatigue and fever.   Eyes:  Negative for visual disturbance.   Respiratory:  Negative for cough, chest tightness and shortness of breath.    Cardiovascular:  Negative for chest pain, palpitations and leg swelling.   Gastrointestinal:  Negative for abdominal pain, anal bleeding, blood in stool and rectal pain.   Genitourinary:  Positive for dysuria and frequency. Negative for difficulty urinating, flank pain, hematuria and penile discharge.   Musculoskeletal:  Negative for arthralgias and myalgias.   Skin:  Negative for rash.   Neurological:  Negative for weakness and headaches.   Psychiatric/Behavioral:  Negative for behavioral problems and sleep disturbance.        Objective   /82 (BP Location: Left arm, Patient Position: Sitting, BP Cuff Size: Large adult)   Pulse 64   Temp 35.8 °C (96.4 °F) (Temporal)   Resp 16   Ht 1.829 m (6')   Wt 95.3 kg (210 lb)   SpO2 98%   BMI 28.48 kg/m²       T UA Automated manually resulted (Order [id number])  All Reviewers List    Lucia Small MA on 7/19/2024 11:02   Braydon Kelsey DO on 7/18/2024 22:09   Braydon Kelsey DO on 7/18/2024 12:53     POCT UA Automated manually resulted  Order: [id number]    Status: Final result       Visible to patient: Yes (not seen)       Next appt: 08/07/2024 at 10:15 AM in Primary Care (Braydon Kelsey DO)       Dx: Acute bacterial prostatitis    4 Result Notes        Component  Ref Range & Units 4 d ago 3 wk ago 1 yr ago   POC Color, Urine  Straw, Yellow, Light-Yellow Yellow Yellow Yellow R   POC Appearance, Urine  Clear Clear Clear Clear   POC Glucose, Urine  NEGATIVE mg/dl NEGATIVE NEGATIVE NEGATIVE   POC Bilirubin, Urine  NEGATIVE NEGATIVE NEGATIVE NEGATIVE   POC Ketones, Urine  NEGATIVE mg/dl NEGATIVE NEGATIVE NEGATIVE   POC Specific Gravity, Urine  1.005 - 1.035 1.020 1.015 >=1.030   POC Blood, Urine  NEGATIVE NEGATIVE NEGATIVE NEGATIVE   POC PH, Urine  No Reference Range Established PH 5.5 6.5 5.5   POC Protein, Urine  NEGATIVE, 30 (1+) mg/dl NEGATIVE NEGATIVE NEGATIVE   POC Urobilinogen, Urine  0.2, 1.0 EU/DL 0.2 0.2 0.2   Poc Nitrite, Urine  NEGATIVE NEGATIVE NEGATIVE NEGATIVE   POC Leukocytes, Urine  NEGATIVE NEGA            Physical Exam  Vital signs reviewed and normal blood pressure is good  General inspection reveals pleasant or active individual in no acute distress  GI no CVA tenderness no suprapubic tenderness no tenderness of the superior scrotum no rebound tenderness i.e. no organomegaly mass or rebound  Pulmonary chest clear anteriorly and posteriorly  Cardiovascular RSR without murmurs gallop or ectopy  Peripheral vascular no symmetric or asymmetric edema did review essentially improved urinalysis with no hematuria improved trace leukocytes.  Skin-no rashes petechiae or jaundice    Assessment/Plan   Problem List Items Addressed This Visit       Acute bacterial prostatitis    Relevant Orders    POCT UA Automated manually resulted   Despite negative urine today and improved symptoms he still has some residual dysuria so we will switch him to Levaquin 5 mg for another 14 days follow-up in 4 weeks which time we will perform rectal examination.  Stated early this  autumn but PSA for comparison try to avoid alcohol binging pop and extreme caffeine.  Continue his Flomax otherwise clinically improved urine improved from his recent infection  Continue above antihypertensive medicines and low-salt diet  @discharge  The above diagnosis and treatment plan was discussed with the patient patient will continue appropriate diet and exercise as reviewed  Patient will recheck earlier if any interval problems of significance or clinical worsening of the above problems.  Agrees above surveillance.  All question were addressed regarding above meds

## 2024-07-18 LAB — BACTERIA UR CULT: NO GROWTH

## 2024-07-19 ENCOUNTER — APPOINTMENT (OUTPATIENT)
Dept: PRIMARY CARE | Facility: CLINIC | Age: 64
End: 2024-07-19
Payer: COMMERCIAL

## 2024-07-21 PROBLEM — N40.2 PROSTATIC NODULE: Status: RESOLVED | Noted: 2023-10-01 | Resolved: 2024-07-21

## 2024-07-21 ASSESSMENT — ENCOUNTER SYMPTOMS
BLOOD IN STOOL: 0
CHEST TIGHTNESS: 0
RECTAL PAIN: 0
SLEEP DISTURBANCE: 0
ANAL BLEEDING: 0
FATIGUE: 0
COUGH: 0
DIFFICULTY URINATING: 0
FREQUENCY: 1
ARTHRALGIAS: 0
PALPITATIONS: 0
WEAKNESS: 0
FEVER: 0
MYALGIAS: 0
HEADACHES: 0
ABDOMINAL PAIN: 0
SHORTNESS OF BREATH: 0
HEMATURIA: 0
DYSURIA: 1
FLANK PAIN: 0

## 2024-08-07 ENCOUNTER — APPOINTMENT (OUTPATIENT)
Dept: PRIMARY CARE | Facility: CLINIC | Age: 64
End: 2024-08-07
Payer: COMMERCIAL

## 2024-08-07 VITALS
HEIGHT: 72 IN | WEIGHT: 208 LBS | OXYGEN SATURATION: 97 % | BODY MASS INDEX: 28.17 KG/M2 | DIASTOLIC BLOOD PRESSURE: 76 MMHG | RESPIRATION RATE: 16 BRPM | HEART RATE: 60 BPM | SYSTOLIC BLOOD PRESSURE: 132 MMHG | TEMPERATURE: 97.1 F

## 2024-08-07 DIAGNOSIS — E78.5 DYSLIPIDEMIA: ICD-10-CM

## 2024-08-07 DIAGNOSIS — I10 PRIMARY HYPERTENSION: Primary | ICD-10-CM

## 2024-08-07 DIAGNOSIS — N41.0 ACUTE BACTERIAL PROSTATITIS: ICD-10-CM

## 2024-08-07 DIAGNOSIS — R97.20 ELEVATED PSA MEASUREMENT: ICD-10-CM

## 2024-08-07 LAB
POC APPEARANCE, URINE: CLEAR
POC BILIRUBIN, URINE: NEGATIVE
POC BLOOD, URINE: NEGATIVE
POC COLOR, URINE: YELLOW
POC GLUCOSE, URINE: NEGATIVE MG/DL
POC KETONES, URINE: NEGATIVE MG/DL
POC LEUKOCYTES, URINE: NEGATIVE
POC NITRITE,URINE: NEGATIVE
POC PH, URINE: 6 PH
POC PROTEIN, URINE: NEGATIVE MG/DL
POC SPECIFIC GRAVITY, URINE: >=1.03
POC UROBILINOGEN, URINE: 0.2 EU/DL

## 2024-08-07 PROCEDURE — 3078F DIAST BP <80 MM HG: CPT | Performed by: FAMILY MEDICINE

## 2024-08-07 PROCEDURE — 99213 OFFICE O/P EST LOW 20 MIN: CPT | Performed by: FAMILY MEDICINE

## 2024-08-07 PROCEDURE — 3008F BODY MASS INDEX DOCD: CPT | Performed by: FAMILY MEDICINE

## 2024-08-07 PROCEDURE — 3075F SYST BP GE 130 - 139MM HG: CPT | Performed by: FAMILY MEDICINE

## 2024-08-07 PROCEDURE — 81003 URINALYSIS AUTO W/O SCOPE: CPT | Performed by: FAMILY MEDICINE

## 2024-08-07 NOTE — PROGRESS NOTES
Subjective   Patient ID: Aman Blanco is a 63 y.o. male who presents for Acute bacterial prostatitis (3 week follow up ).  HPI  63-year-old gentleman here to recheck his acute prostatitis after switching from Cipro after mild to moderate improvement to Levaquin 5 mg daily for 14 days claims on the Levaquin he had much less urinary urgency less nocturia and less burning at this time he has minimal nocturia some urgency but otherwise no further dysuria no hematuria failed to get his repeat PSA last winter and I did order the repeat PSA when he gets his fasting LDL and CMP in 2 months.  He is aware to minimize alcohol to minimize prostatic swelling increase liquids and minimize pop and caffeine as well.  For this MRI last year of the prostate was unremarkable for neoplasm  Otherwise the patient remains on losartan 100 mg daily metoprolol 25 mg daily and Flomax after supper  Review of Systems   Constitutional:  Negative for fatigue, fever and unexpected weight change.   Eyes:  Negative for visual disturbance.   Respiratory:  Negative for cough, chest tightness and shortness of breath.    Cardiovascular:  Negative for chest pain, palpitations and leg swelling.   Gastrointestinal:  Positive for rectal pain. Negative for abdominal pain and blood in stool.   Genitourinary:  Positive for frequency and urgency. Negative for dysuria, flank pain, hematuria, penile discharge and testicular pain.   Musculoskeletal:  Positive for myalgias. Negative for arthralgias.   Skin:  Negative for rash.   Neurological:  Negative for weakness, light-headedness and headaches.   Psychiatric/Behavioral:  Negative for behavioral problems, confusion, sleep disturbance and suicidal ideas.        Objective   /76 (BP Location: Left arm, Patient Position: Sitting, BP Cuff Size: Large adult)   Pulse 60   Temp 36.2 °C (97.1 °F) (Temporal)   Resp 16   Ht 1.829 m (6')   Wt 94.3 kg (208 lb)   SpO2 97%   BMI 28.21 kg/m²               Component  Ref Range & Units 11:23 3 wk ago 1 mo ago 1 yr ago   POC Color, Urine  Straw, Yellow, Light-Yellow Yellow Yellow Yellow Yellow R   POC Appearance, Urine  Clear Clear Clear Clear Clear   POC Glucose, Urine  NEGATIVE mg/dl NEGATIVE NEGATIVE NEGATIVE NEGATIVE   POC Bilirubin, Urine  NEGATIVE NEGATIVE NEGATIVE NEGATIVE NEGATIVE   POC Ketones, Urine  NEGATIVE mg/dl NEGATIVE NEGATIVE NEGATIVE NEGATIVE   POC Specific Gravity, Urine  1.005 - 1.035 >=1.030 1.020 1.015 >=1.030   POC Blood, Urine  NEGATIVE NEGATIVE NEGATIVE NEGATIVE NEGATIVE   POC PH, Urine  No Reference Range Established PH 6.0 5.5 6.5 5.5   POC Protein, Urine  NEGATIVE, 30 (1+) mg/dl NEGATIVE NEGATIVE NEGATIVE NEGATIVE   POC Urobilinogen, Urine  0.2, 1.0 EU/DL 0.2 0.2 0.2 0.2   Poc Nitrite, Urine  NEGATIVE NEGATIVE NEGATIVE NEGATIVE NEGATIVE   POC Leukocytes, Urine  NEGATIVE NEGATIVE NEGATIVE TRACE Abnormal  NEGATIVE              Specimen Collected: 08/07/24 11:23 Last Resulted: 08/07/24 11:23            Physical Exam  Vital signs reviewed and are normal  General And Spectrila more relaxed individual in no acute distress  Neck neck is all without mass adenopathy bruits rigidity  Cardiovascular RSR without murmurs gallop or ectopy  Pulmonary chest clear anteriorly and posteriorly  Abdominal no organomegaly mass rebound no CVA tenderness no suprapubic tenderness patient declines rectal exam but will get after his PSA  Peripheral vascular no symmetric or asymmetric edema no sensorimotor vascular deficits  Skin no rashes petechiae or jaundice  Repeat urine is completely negative and markedly improved symptoms off his Levaquin      Assessment/Plan   Problem List Items Addressed This Visit    None  Negative urine and markedly improved symptoms can remain off antibiotic and will recheck in about 6 to 8 weeks with previsit fasting PSA LDL CMP and declined rectal but will get the rectal after his PSA is earlier MRI of the prostate was unremarkable but  has done well with his Flomax at nighttime and will continue low-salt diet low alcohol low caffeine diet in addition to the above 2 antihypertensive medicines call earlier if interval problems aware of the above calling parameters urologically  @discharge  The above diagnosis and treatment plan was discussed with the patient patient will continue appropriate diet and exercise as reviewed  Patient will recheck earlier if any interval problems of significance or clinical worsening of the above problems.  Agrees above surveillance.  All question were addressed regarding above meds

## 2024-08-11 ASSESSMENT — ENCOUNTER SYMPTOMS
LIGHT-HEADEDNESS: 0
ABDOMINAL PAIN: 0
WEAKNESS: 0
UNEXPECTED WEIGHT CHANGE: 0
HEADACHES: 0
FREQUENCY: 1
ARTHRALGIAS: 0
COUGH: 0
BLOOD IN STOOL: 0
FATIGUE: 0
DYSURIA: 0
MYALGIAS: 1
RECTAL PAIN: 1
PALPITATIONS: 0
CHEST TIGHTNESS: 0
HEMATURIA: 0
FEVER: 0
SLEEP DISTURBANCE: 0
CONFUSION: 0
FLANK PAIN: 0
SHORTNESS OF BREATH: 0

## 2024-10-02 ENCOUNTER — LAB (OUTPATIENT)
Dept: LAB | Facility: LAB | Age: 64
End: 2024-10-02
Payer: COMMERCIAL

## 2024-10-02 DIAGNOSIS — E78.5 DYSLIPIDEMIA: ICD-10-CM

## 2024-10-02 DIAGNOSIS — R97.20 ELEVATED PSA MEASUREMENT: ICD-10-CM

## 2024-10-02 DIAGNOSIS — I10 PRIMARY HYPERTENSION: ICD-10-CM

## 2024-10-02 LAB
ALBUMIN SERPL BCP-MCNC: 4.5 G/DL (ref 3.4–5)
ALP SERPL-CCNC: 79 U/L (ref 33–136)
ALT SERPL W P-5'-P-CCNC: 30 U/L (ref 10–52)
ANION GAP SERPL CALC-SCNC: 9 MMOL/L (ref 10–20)
AST SERPL W P-5'-P-CCNC: 24 U/L (ref 9–39)
BILIRUB SERPL-MCNC: 1.1 MG/DL (ref 0–1.2)
BUN SERPL-MCNC: 13 MG/DL (ref 6–23)
CALCIUM SERPL-MCNC: 9.2 MG/DL (ref 8.6–10.3)
CHLORIDE SERPL-SCNC: 104 MMOL/L (ref 98–107)
CO2 SERPL-SCNC: 31 MMOL/L (ref 21–32)
CREAT SERPL-MCNC: 0.86 MG/DL (ref 0.5–1.3)
EGFRCR SERPLBLD CKD-EPI 2021: >90 ML/MIN/1.73M*2
GLUCOSE SERPL-MCNC: 86 MG/DL (ref 74–99)
LDLC SERPL DIRECT ASSAY-MCNC: 123 MG/DL (ref 0–129)
POTASSIUM SERPL-SCNC: 4.4 MMOL/L (ref 3.5–5.3)
PROT SERPL-MCNC: 7.1 G/DL (ref 6.4–8.2)
PSA SERPL-MCNC: 5.01 NG/ML
SODIUM SERPL-SCNC: 140 MMOL/L (ref 136–145)

## 2024-10-02 PROCEDURE — 36415 COLL VENOUS BLD VENIPUNCTURE: CPT

## 2024-10-02 PROCEDURE — 84153 ASSAY OF PSA TOTAL: CPT

## 2024-10-02 PROCEDURE — 83721 ASSAY OF BLOOD LIPOPROTEIN: CPT

## 2024-10-02 PROCEDURE — 80053 COMPREHEN METABOLIC PANEL: CPT

## 2024-10-03 DIAGNOSIS — N41.0 ACUTE BACTERIAL PROSTATITIS: Primary | ICD-10-CM

## 2024-10-03 RX ORDER — CIPROFLOXACIN 500 MG/1
500 TABLET ORAL
Qty: 28 TABLET | Refills: 0 | Status: SHIPPED | OUTPATIENT
Start: 2024-10-03 | End: 2024-10-17

## 2024-10-07 ENCOUNTER — APPOINTMENT (OUTPATIENT)
Dept: PRIMARY CARE | Facility: CLINIC | Age: 64
End: 2024-10-07
Payer: COMMERCIAL

## 2024-10-07 VITALS
BODY MASS INDEX: 28.58 KG/M2 | HEART RATE: 60 BPM | RESPIRATION RATE: 16 BRPM | WEIGHT: 211 LBS | SYSTOLIC BLOOD PRESSURE: 128 MMHG | OXYGEN SATURATION: 98 % | DIASTOLIC BLOOD PRESSURE: 82 MMHG | HEIGHT: 72 IN | TEMPERATURE: 96.6 F

## 2024-10-07 DIAGNOSIS — I10 PRIMARY HYPERTENSION: ICD-10-CM

## 2024-10-07 DIAGNOSIS — L98.9 ARM SKIN LESION, RIGHT: ICD-10-CM

## 2024-10-07 DIAGNOSIS — Z23 NEED FOR INFLUENZA VACCINATION: ICD-10-CM

## 2024-10-07 DIAGNOSIS — R39.11 BENIGN PROSTATIC HYPERPLASIA WITH URINARY HESITANCY: ICD-10-CM

## 2024-10-07 DIAGNOSIS — N40.1 BENIGN PROSTATIC HYPERPLASIA WITH URINARY HESITANCY: ICD-10-CM

## 2024-10-07 DIAGNOSIS — N41.0 ACUTE BACTERIAL PROSTATITIS: Primary | ICD-10-CM

## 2024-10-07 DIAGNOSIS — R97.20 ELEVATED PSA MEASUREMENT: ICD-10-CM

## 2024-10-07 PROCEDURE — 90471 IMMUNIZATION ADMIN: CPT | Performed by: FAMILY MEDICINE

## 2024-10-07 PROCEDURE — 3079F DIAST BP 80-89 MM HG: CPT | Performed by: FAMILY MEDICINE

## 2024-10-07 PROCEDURE — 90656 IIV3 VACC NO PRSV 0.5 ML IM: CPT | Performed by: FAMILY MEDICINE

## 2024-10-07 PROCEDURE — 99213 OFFICE O/P EST LOW 20 MIN: CPT | Performed by: FAMILY MEDICINE

## 2024-10-07 PROCEDURE — 3008F BODY MASS INDEX DOCD: CPT | Performed by: FAMILY MEDICINE

## 2024-10-07 PROCEDURE — 3074F SYST BP LT 130 MM HG: CPT | Performed by: FAMILY MEDICINE

## 2024-10-07 RX ORDER — TAMSULOSIN HYDROCHLORIDE 0.4 MG/1
0.4 CAPSULE ORAL 2 TIMES DAILY
Qty: 180 CAPSULE | Refills: 3 | Status: SHIPPED | OUTPATIENT
Start: 2024-10-07

## 2024-10-07 RX ORDER — LOSARTAN POTASSIUM 100 MG/1
100 TABLET ORAL DAILY
Qty: 90 TABLET | Refills: 3 | Status: CANCELLED | OUTPATIENT
Start: 2024-10-07

## 2024-10-07 RX ORDER — METOPROLOL SUCCINATE 25 MG/1
25 TABLET, EXTENDED RELEASE ORAL DAILY
Qty: 90 TABLET | Refills: 3 | Status: CANCELLED | OUTPATIENT
Start: 2024-10-07

## 2024-10-07 NOTE — PROGRESS NOTES
Subjective   Patient ID: Aman Blanco is a 64 y.o. male who presents for Hypertension (2 month follow up ).  HPI  64-year-old gentleman here to check his elevated PSA recently for which a fell through a conversation could be elevated because of recent infection and hence on 3 October we did call in 2 weeks but Cipro is currently tolerating this and he did have some dysuria prior to getting his blood drawn secondly he remains on his 2 antihypertensive medicines doing well try to follow a low-salt low carbohydrate diet did review low caffeine low alcohol diet to help both the above morbidities.  Denies any recent chest pain short of breath palpitation no flank pain no abdominal pain but increased urinary frequency and mild late dysuria has been reported.  I did review his normal chemistries  Is a small mole which been growing on his right  arm like to have looked at and will send to Derm for potential biopsy  Review of Systems   Constitutional:  Negative for fatigue and fever.   Eyes:  Negative for visual disturbance.   Respiratory:  Negative for cough, chest tightness and shortness of breath.    Cardiovascular:  Negative for chest pain, palpitations and leg swelling.   Gastrointestinal:  Negative for abdominal pain and blood in stool.   Genitourinary:  Positive for dysuria and frequency. Negative for hematuria, penile discharge, penile pain and scrotal swelling.   Musculoskeletal:  Negative for arthralgias and myalgias.   Skin:  Positive for rash.   Neurological:  Negative for weakness and headaches.   Psychiatric/Behavioral:  Negative for behavioral problems, confusion, sleep disturbance and suicidal ideas.        Objective   /82 (BP Location: Left arm, Patient Position: Sitting, BP Cuff Size: Large adult)   Pulse 60   Temp 35.9 °C (96.6 °F) (Temporal)   Resp 16   Ht 1.829 m (6')   Wt 95.7 kg (211 lb)   SpO2 98%   BMI 28.62 kg/m²     Lab Results   Component Value Date    GLUCOSE 86 10/02/2024     CALCIUM 9.2 10/02/2024     10/02/2024    K 4.4 10/02/2024    CO2 31 10/02/2024     10/02/2024    BUN 13 10/02/2024    CREATININE 0.86 10/02/2024     Lab Results   Component Value Date    PSA 5.01 (H) 10/02/2024    PSA 3.70 12/10/2020    PSA 3.15 12/10/2019         Component  Ref Range & Units 5 d ago   LDL, Direct  0 - 129 mg/dL 123   Resulting Agency [facility]              Narrative  Performed by: [facility]  Elevated levels of LDL cholesterol are recognized as a key factor in the development of atherosclerosis and CHD. The direct LDL cholesterol test can be used to assess cardiovascular risk and monitor therapy as a follow up to  a lipid profile when triglycerides are significantly elevated.      Specimen Collected: 10/02/24 08:58 Last Resulted: 10/02/24 18:07        Physical Exam  Vital signs reviewed and normal  Neck neck is without mass adenopathy bruits rigidity  Pulmonary-chest clear anteriorly and posteriorly  Cardiovascular regular sinus rhythm without significant murmurs gallop or ectopy  Abdominal no CVA tenderness no suprapubic fullness.  Declined rectal otherwise abdomen is without organomegaly mass or rebound bowel sounds are normal  Peripheral vascular no symmetric asymmetric edema no sensorimotor vascular deficits.  Reviewed the above elevation PSA as well as normal chemistries  Skin small perhaps 4 x 5 mm keratotic lesion of the right arm is noted has grown recently but regular borders and semisuspicious      Assessment/Plan   Problem List Items Addressed This Visit       HTN (hypertension)    Elevated PSA measurement     Other Visit Diagnoses       Need for influenza vaccination            First, will get updated influenza;  #2 because the growing size of the right arm skin lesion although appearing benign, will refer to dermatology for potential excisional biopsy to clarify the exact histology.  Will also continue his post supper Flomax  Because of interval elevation of PSA with some signs and  of the infection will place on 2 weeks of Cipro and I will recheck him in 4 weeks for exam and then follow-up PSA in about 3 months.  Modic modification and caffeine pop as well as alcohol strongly urged  Continue low-salt diet and the above antihypertensive medicines above diagnosis treatment plan  @discharge  The above diagnosis and treatment plan was discussed with the patient patient will continue appropriate diet and exercise as reviewed  Patient will recheck earlier if any interval problems of significance or clinical worsening of the above problems.  Agrees above surveillance.  All question were addressed regarding above meds

## 2024-10-10 PROBLEM — N40.1 BENIGN PROSTATIC HYPERPLASIA WITH URINARY HESITANCY: Status: ACTIVE | Noted: 2024-10-10

## 2024-10-10 PROBLEM — R39.11 BENIGN PROSTATIC HYPERPLASIA WITH URINARY HESITANCY: Status: ACTIVE | Noted: 2024-10-10

## 2024-10-10 PROBLEM — Z23 NEED FOR INFLUENZA VACCINATION: Status: ACTIVE | Noted: 2024-10-10

## 2024-10-10 PROBLEM — L98.9 ARM SKIN LESION, RIGHT: Status: ACTIVE | Noted: 2024-10-10

## 2024-10-10 ASSESSMENT — ENCOUNTER SYMPTOMS
HEADACHES: 0
FATIGUE: 0
FEVER: 0
PALPITATIONS: 0
BLOOD IN STOOL: 0
COUGH: 0
SLEEP DISTURBANCE: 0
CONFUSION: 0
FREQUENCY: 1
ABDOMINAL PAIN: 0
ARTHRALGIAS: 0
WEAKNESS: 0
CHEST TIGHTNESS: 0
DYSURIA: 1
SHORTNESS OF BREATH: 0
HEMATURIA: 0
MYALGIAS: 0

## 2024-10-21 DIAGNOSIS — N41.0 ACUTE BACTERIAL PROSTATITIS: ICD-10-CM

## 2024-10-21 RX ORDER — CIPROFLOXACIN 500 MG/1
500 TABLET ORAL DAILY
Qty: 7 TABLET | Refills: 0 | Status: SHIPPED | OUTPATIENT
Start: 2024-10-21 | End: 2024-10-28

## 2024-10-21 NOTE — TELEPHONE ENCOUNTER
The patient is requesting for a new rx to be sent to the pharmacy as CIPROFLOXACIN 500 MG 1 TAB BY MOUTH DAILY. FOR 7 DAYS SUPPLY.      [facility]/ [facility]    Please approve or deny.  Thank you.

## 2024-11-18 ENCOUNTER — APPOINTMENT (OUTPATIENT)
Dept: PRIMARY CARE | Facility: CLINIC | Age: 64
End: 2024-11-18
Payer: COMMERCIAL

## 2024-11-18 VITALS
SYSTOLIC BLOOD PRESSURE: 140 MMHG | HEIGHT: 72 IN | RESPIRATION RATE: 16 BRPM | DIASTOLIC BLOOD PRESSURE: 86 MMHG | BODY MASS INDEX: 28.85 KG/M2 | HEART RATE: 59 BPM | WEIGHT: 213 LBS | OXYGEN SATURATION: 98 % | TEMPERATURE: 96.4 F

## 2024-11-18 DIAGNOSIS — R97.20 ELEVATED PSA: ICD-10-CM

## 2024-11-18 DIAGNOSIS — I10 PRIMARY HYPERTENSION: ICD-10-CM

## 2024-11-18 DIAGNOSIS — J40 SINOBRONCHITIS: Primary | ICD-10-CM

## 2024-11-18 DIAGNOSIS — J32.9 SINOBRONCHITIS: Primary | ICD-10-CM

## 2024-11-18 PROCEDURE — 99213 OFFICE O/P EST LOW 20 MIN: CPT | Performed by: FAMILY MEDICINE

## 2024-11-18 PROCEDURE — 3079F DIAST BP 80-89 MM HG: CPT | Performed by: FAMILY MEDICINE

## 2024-11-18 PROCEDURE — 3008F BODY MASS INDEX DOCD: CPT | Performed by: FAMILY MEDICINE

## 2024-11-18 PROCEDURE — 3077F SYST BP >= 140 MM HG: CPT | Performed by: FAMILY MEDICINE

## 2024-11-18 RX ORDER — AZITHROMYCIN 250 MG/1
TABLET, FILM COATED ORAL
Qty: 6 TABLET | Refills: 0 | Status: SHIPPED | OUTPATIENT
Start: 2024-11-18

## 2024-11-18 NOTE — PROGRESS NOTES
Subjective   Patient ID: Aman Blanco is a 64 y.o. male who presents for Acute bacterial prostatitis and Cough (Productive cough with sputum ).  HPI  Pleasant 64-year-old M with a history of hypertension doing well on both losartan and metoprolol 25 mg daily.  No chest pain short of the palpitations does take Flomax and felt much better with improved voiding less dysuria less nocturia on his Cipro roughly about 4 weeks ago.  He needs a follow-up PSA before the holidays but otherwise no hematuria and no GI or  you red flags after careful review no chest pain or shortness of breath at rest or with exertion.  Minimal cough with postnasal drainage of the last 3 to 4 days and increasing slightly productive cough in the late evening hours.  Otherwise no striking dyspnea on exertion high fever significant headache myalgia or GI symptoms or rash  Review of Systems   Constitutional:  Negative for fatigue, fever and unexpected weight change.   HENT:  Positive for rhinorrhea and sinus pressure. Negative for sore throat, trouble swallowing and voice change.    Eyes:  Negative for visual disturbance.   Respiratory:  Positive for cough. Negative for chest tightness, shortness of breath and wheezing.    Cardiovascular:  Negative for chest pain, palpitations and leg swelling.   Gastrointestinal:  Negative for abdominal pain, blood in stool and vomiting.   Genitourinary:  Positive for frequency. Negative for dysuria, flank pain, hematuria and testicular pain.   Musculoskeletal:  Positive for myalgias. Negative for arthralgias.   Skin:  Negative for rash.   Neurological:  Negative for syncope, weakness, light-headedness, numbness and headaches.   Psychiatric/Behavioral:  Negative for behavioral problems, confusion, sleep disturbance and suicidal ideas.        Objective   /86 (BP Location: Left arm, Patient Position: Sitting, BP Cuff Size: Large adult)   Pulse 59   Temp 35.8 °C (96.4 °F) (Temporal)   Resp 16   Ht  1.829 m (6')   Wt 96.6 kg (213 lb)   SpO2 98%   BMI 28.89 kg/m²           Physical Exam  Vital sign reviewed and normal pulse ox is normal  ENT eyes clear PERRL bilaterally nose shows turbinate swelling with yellow rhinorrhea right greater than left minimal tenderness to right maxillary sinus.  Both TMs retracted scarred without hyperemia oral exam with posterior injection only  Neck neck is supple out masses adenopathy bruits rigidity no JVD no stridor and thyroid is normal  Chest very faint upper lobe expiratory rhonchi cleared with coughing otherwise no striking wheezing.  No bibasilar rales or rubs  Cardiovascular RSR without murmurs gallop or ectopy  Abdominal no CVA or suprapubic fullness remaining abdomen is negative  Peripheral vascular no symmetric or asymmetric edema distal pulses are intact      Assessment/Plan   Problem List Items Addressed This Visit    None  Continue his normal saline to the nose in the evening and nasal steroids in the morning will add Z-Hernando for the buffed sinobronchitis continue good routines otherwise continue low carbohydrate low alcohol diet and we will check his PSA right before the holidays and then repeat visit consider MRI if elevation persist or remaining elevation of 5  Agrees above diagnosis treatment plan important surveillance of the above lab results  @discharge  The above diagnosis and treatment plan was discussed with the patient patient will continue appropriate diet and exercise as reviewed  Patient will recheck earlier if any interval problems of significance or clinical worsening of the above problems.  Agrees above surveillance.  All question were addressed regarding above meds

## 2024-11-21 PROBLEM — J40 SINOBRONCHITIS: Status: ACTIVE | Noted: 2024-11-21

## 2024-11-21 PROBLEM — J32.9 SINOBRONCHITIS: Status: ACTIVE | Noted: 2024-11-21

## 2024-11-21 ASSESSMENT — ENCOUNTER SYMPTOMS
ABDOMINAL PAIN: 0
NUMBNESS: 0
SLEEP DISTURBANCE: 0
FATIGUE: 0
VOMITING: 0
ARTHRALGIAS: 0
COUGH: 1
DYSURIA: 0
SHORTNESS OF BREATH: 0
BLOOD IN STOOL: 0
FEVER: 0
RHINORRHEA: 1
MYALGIAS: 1
CHEST TIGHTNESS: 0
SORE THROAT: 0
CONFUSION: 0
LIGHT-HEADEDNESS: 0
HEADACHES: 0
TROUBLE SWALLOWING: 0
UNEXPECTED WEIGHT CHANGE: 0
SINUS PRESSURE: 1
VOICE CHANGE: 0
HEMATURIA: 0
WHEEZING: 0
WEAKNESS: 0
FLANK PAIN: 0
FREQUENCY: 1
PALPITATIONS: 0

## 2024-12-04 ENCOUNTER — TELEPHONE (OUTPATIENT)
Dept: PRIMARY CARE | Facility: CLINIC | Age: 64
End: 2024-12-04
Payer: COMMERCIAL

## 2024-12-04 DIAGNOSIS — N41.0 ACUTE BACTERIAL PROSTATITIS: Primary | ICD-10-CM

## 2024-12-04 RX ORDER — CIPROFLOXACIN 500 MG/1
500 TABLET ORAL
Qty: 28 TABLET | Refills: 0 | Status: SHIPPED | OUTPATIENT
Start: 2024-12-04 | End: 2024-12-18

## 2024-12-04 NOTE — TELEPHONE ENCOUNTER
The patient has started to have symptoms of discomfort in his prostate area yesterday. He has found it difficult to urinate.  He is requesting medication to be treated.    NOV: 12/20/2024    [facility] / [place]    Please advise.  Thank you.

## 2024-12-17 ENCOUNTER — LAB (OUTPATIENT)
Dept: LAB | Facility: LAB | Age: 64
End: 2024-12-17
Payer: COMMERCIAL

## 2024-12-17 DIAGNOSIS — R97.20 ELEVATED PSA: ICD-10-CM

## 2024-12-17 LAB — PSA SERPL-MCNC: 6.05 NG/ML

## 2024-12-17 PROCEDURE — 36415 COLL VENOUS BLD VENIPUNCTURE: CPT

## 2024-12-17 PROCEDURE — 84153 ASSAY OF PSA TOTAL: CPT

## 2024-12-20 ENCOUNTER — APPOINTMENT (OUTPATIENT)
Dept: PRIMARY CARE | Facility: CLINIC | Age: 64
End: 2024-12-20
Payer: COMMERCIAL

## 2024-12-20 VITALS
SYSTOLIC BLOOD PRESSURE: 136 MMHG | HEIGHT: 72 IN | HEART RATE: 62 BPM | BODY MASS INDEX: 28.58 KG/M2 | WEIGHT: 211 LBS | OXYGEN SATURATION: 97 % | DIASTOLIC BLOOD PRESSURE: 78 MMHG | TEMPERATURE: 96.3 F | RESPIRATION RATE: 16 BRPM

## 2024-12-20 DIAGNOSIS — N41.0 ACUTE PROSTATITIS: Primary | ICD-10-CM

## 2024-12-20 DIAGNOSIS — N41.0 ACUTE BACTERIAL PROSTATITIS: ICD-10-CM

## 2024-12-20 DIAGNOSIS — R97.20 ELEVATED PSA: ICD-10-CM

## 2024-12-20 PROCEDURE — 3075F SYST BP GE 130 - 139MM HG: CPT | Performed by: FAMILY MEDICINE

## 2024-12-20 PROCEDURE — 3008F BODY MASS INDEX DOCD: CPT | Performed by: FAMILY MEDICINE

## 2024-12-20 PROCEDURE — 81003 URINALYSIS AUTO W/O SCOPE: CPT | Performed by: FAMILY MEDICINE

## 2024-12-20 PROCEDURE — 3078F DIAST BP <80 MM HG: CPT | Performed by: FAMILY MEDICINE

## 2024-12-20 PROCEDURE — 99213 OFFICE O/P EST LOW 20 MIN: CPT | Performed by: FAMILY MEDICINE

## 2024-12-20 RX ORDER — SULFAMETHOXAZOLE AND TRIMETHOPRIM 800; 160 MG/1; MG/1
TABLET ORAL
Qty: 42 TABLET | Refills: 0 | Status: SHIPPED | OUTPATIENT
Start: 2024-12-20

## 2024-12-20 NOTE — PROGRESS NOTES
Subjective   Patient ID: Aman Blanco is a 64 y.o. male who presents for Acute bacterial prostatitis (4 week follow up ).  HPI  Here to recheck a PSA went from 5-6 he felt great on Cipro roughly 3 to 4 weeks ago but had  increased urinary frequency some burning the last 3 to 4 days at which time he got a repeat PSA which went to 6 --as MRI last autumn was good he had mild fistula but otherwise no suspicious lesions seen in the prostate on MRI  To reduce his alcohol intake as well as caffeine and pop  No dyschezia no change in bowel habits no abdominal pain some returning polyuria in the daytime and no increased nocturia no hematuria and really no GI  red flags after careful review reviewed earlier MRI from 2023  Libby on Toprol and losartan for hypertension  Review of Systems   Constitutional:  Negative for fatigue, fever and unexpected weight change.   Eyes:  Negative for visual disturbance.   Respiratory:  Negative for cough, chest tightness and shortness of breath.    Cardiovascular:  Negative for chest pain, palpitations and leg swelling.   Gastrointestinal:  Negative for abdominal distention, abdominal pain, anal bleeding, blood in stool and nausea.   Genitourinary:  Positive for frequency. Negative for difficulty urinating, dysuria, flank pain, hematuria and testicular pain.   Musculoskeletal:  Positive for myalgias. Negative for arthralgias.   Skin:  Negative for rash.   Neurological:  Negative for weakness, light-headedness and headaches.   Psychiatric/Behavioral:  Negative for behavioral problems, confusion, sleep disturbance and suicidal ideas.        Objective   /78 (BP Location: Left arm, Patient Position: Sitting, BP Cuff Size: Large adult)   Pulse 62   Temp 35.7 °C (96.3 °F) (Temporal)   Resp 16   Ht 1.829 m (6')   Wt 95.7 kg (211 lb)   SpO2 97%   BMI 28.62 kg/m²     Lab Results   Component Value Date    PSA 6.05 (H) 12/17/2024    PSA 5.01 (H) 10/02/2024    PSA 3.70 12/10/2020              Component  Ref Range & Units 10:28  (12/20/24) 4 mo ago  (8/7/24) 5 mo ago  (7/17/24) 5 mo ago  (6/28/24) 1 yr ago  (6/19/23)   POC Color, Urine  Straw, Yellow, Light-Yellow Yellow Yellow Yellow Yellow Yellow R   POC Appearance, Urine  Clear Clear Clear Clear Clear Clear   POC Glucose, Urine  NEGATIVE mg/dl NEGATIVE NEGATIVE NEGATIVE NEGATIVE NEGATIVE   POC Bilirubin, Urine  NEGATIVE NEGATIVE NEGATIVE NEGATIVE NEGATIVE NEGATIVE   POC Ketones, Urine  NEGATIVE mg/dl NEGATIVE NEGATIVE NEGATIVE NEGATIVE NEGATIVE   POC Specific Gravity, Urine  1.005 - 1.035 >=1.030 >=1.030 1.020 1.015 >=1.030   POC Blood, Urine  NEGATIVE NEGATIVE NEGATIVE NEGATIVE NEGATIVE NEGATIVE   POC PH, Urine  No Reference Range Established PH 6.0 6.0 5.5 6.5 5.5   POC Protein, Urine  NEGATIVE mg/dl NEGATIVE NEGATIVE R NEGATIVE R NEGATIVE R NEGATIVE R   POC Urobilinogen, Urine  0.2, 1.0 EU/DL 0.2 0.2 0.2 0.2 0.2   Poc Nitrite, Urine  NEGATIVE NEGATIVE NEGATIVE NEGATIVE NEGATIVE NEGATIVE   POC Leukocytes, Urine  NEGATIVE NEGATIVE NEGATIVE NEGATIVE TRACE Abnormal  NEGATIVE             Specimen Collected: 12/20/24 10:28 Last Resulted: 12/20/24 10:28   LDL cholesterol, direct  Order: [id number]   Status: Final result       Visible to patient: Yes (seen)       Dx: Dyslipidemia    4 Result Notes       1  Topic      Component  Ref Range & Units 2 mo ago   LDL, Direct  0 - 129 mg/dL 123   Resulting Agency [facility]              Narrative  Performed by: [facility]  Elevated levels    sive metabolic panel  Order: [id number]   Status: Final result       Visible to patient: Yes (seen)       Dx: Primary hypertension    4 Result Notes          Component  Ref Range & Units 2 mo ago 1 yr ago 3 yr ago 4 yr ago 5 yr ago   Glucose  74 - 99 mg/dL 86 85 88 95 104 High    Sodium  136 - 145 mmol/L 140 139 139 141 140   Potassium  3.5 - 5.3 mmol/L 4.4 4.0 4.2 3.9 4.4   Chloride  98 - 107 mmol/L 104 106 105 105 105   Bicarbonate  21 - 32 mmol/L 31 28 29 28 28   Anion Gap  10 -  20 mmol/L 9 Low  9 Low  9 Low  12 11   Urea Nitrogen  6 - 23 mg/dL 13 14 16 15 12   Creatinine  0.50 - 1.30 mg/dL 0.86 0.92 0.89 0.99 0.92   eGFR  >60 mL/min/1.73m*2 >90       Comment: Calculations of estimated GFR are performed using the 2021 CKD-EPI Study Refit equation without the race variable for the IDMS-Traceable creatinine methods.  https://jasn.asnjournals.org/content/early/2021/09/22/ASN.6764552432   Calcium  8.6 - 10.3 mg/dL 9.2 9.1 9.1 9.3 9.3 R   Albumin  3.4 - 5.0 g/dL 4.5 4.0 4.3 4.5 4.5   Alkaline Phosphatase  33 - 136 U/L 79 67 75 91 R 88 R   Total Protein  6.4 - 8.2 g/dL 7.1 7.1 6.9 7.2 6.9   AST  9 - 39 U/L 24 21 21 21 27   Bilirubin, Total  0.0 - 1.2 mg/dL 1.1 1.0 0.9 0.9 0.7   ALT  10 - 52 U/L 30 21 CM 23 CM 21 CM 48 CM   Comment: Patients t      MR prostate  Status: Final result     PACS Images     Show images for MR prostate  Signed by    Signed Time Phone Pager   Kane Demarco MD 10/05/2023 13:18 [phone]     Exam Information    Status Exam Begun Exam Ended   Final 10/05/2023 10:34 10/05/2023 11:19     Study Result    Narrative   Interpreted By:  Kane Rojo and Velez-Martinez Osvaldo  STUDY:  MR PROSTATE;  10/5/2023 11:19 am      INDICATION:  Signs/Symptoms:left lobe .7cm nodule/US; previous elevated PSA.  63-year-old male underwent a transrectal ultrasound for elevated  PSA/BPH on 06/21/2023 which demonstrated a nodule. MRI requested for  further evaluation.      PSA values  4.5 on 06/09/2023  5.29 on 01/05/2023  3.30 on 12/02/2021  3.70 on 12/10/2020  3.15 on 12/10/2019      COMPARISON:  Transrectal ultrasound 06/21/2023      ACCESSION NUMBER(S):  [id number]      ORDERING CLINICIAN:  JAIME LOMBARDO      TECHNIQUE:  Multiplanar MRI of the pelvis was obtained including axial, sagittal  and coronal T2 weighted SSFSE, axial and sagittal T2 FSE, axial DWI,  pre and post gadolinium dynamic T1 GRE sequences. Multiparametric  analysis was performed.      19  mL Dotarem gadolinium based contrast was administered  intravenously without immediate complications.      FINDINGS:  PROSTATE VOLUME:  The prostate measures  5.4 cm x  5.0 cm  x  6.1 cm in right-to-left,  anterior-posterior and craniocaudal dimension.      Prostate weight is estimated at 86.2g. PSA density is 0.05 ng/mL/g.      PROSTATE PARENCHYMA:  Prominence of the median lobe causing indentation over the bladder  floor. There are BPH changes of the transition zone noted. The  peripheral zone is diffusely heterogenous on T2WI, with bilateral  polygonal and wedge-shaped T2-hypointense areas, that show no signs  of abnormally restricted diffusion (PI-RADS 2).      EXTRACAPSULAR EXTENSION:  None.      SEMINAL VESICLES:  Within normal limits.      PELVIC LYMPH NODES:  No abnormally enlarged pelvic lymph nodes are identified.      PERITONEUM:  No free or loculated fluid collections are evident in the pelvis.      OTHER ORGANS:  There is mild circumferential thickening of the bladder, with  prominent trabeculation, likely sequela of chronic outlet obstruction.      There is a linear tract consistent with an intersphincteric perianal  fistula, with internal orifice in the right lateral aspect of the  anal canal (9 o'clock position) approximately 4.6 cm from the  ano-cutaneous transition, coursing along adjacent intersphincteric  fat and draining apparently in the right gluteal cleft. There is T2  hyperintensity and contrast enhancement along this fistulous tract,  consistent with inflammatory activity. No associated fluid  collections or secondary tracts were identified.      BONES:  No focal lesions are noted in the bone.       Impression   1.  BPH changes of the transition zone. Diffuse non nodular  hypointensities within the peripheral zone, without evidence of  focally restricted diffusion ( PI-RADS 2).  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      PI-RADS 2 - Low (clinically significant cancer is  unlikely to be     Physical Exam  Vital sign reviewed and normal  General Inspection was a pleasant or active individual in no distress  Chest chest is clear  Posteriorly  Cardiovascular RSR without significant murmurs gallop or ectopy  Abdominal no organomegaly mass or rebound no suprapubic fullness or pain no CVA tenderness perianal exam is normal digital rectal exam shows very boggy tender right lobe but no nodularity left lobe is normal definite tenderness upon palpation of the prostatic bed no digital masses felt  Peripheral vascular no symmetric or asymmetric edema  Urine was negative but did review PSA going from 5-6      Assessment/Plan   Problem List Items Addressed This Visit       Acute bacterial prostatitis    Relevant Orders    POCT UA Automated manually resulted    Elevated PSA   Symptoms of infection as well as elevated PSA recently placed on Bactrim DS for 20 days and I will recheck his PSA in about 5 weeks and repeat in 6 weeks.  I did review his earlier MRI but may need biopsy if the PSA rises remains the same and or a urological referral declines referral at this time  Significant reduction of alcohol reviewed with him increasing liquids otherwise continue low-salt diet and the above 2 antihypertensive medicines all questions were addressed use side effects of Bactrim DS reviewed  @discharge  The above diagnosis and treatment plan was discussed with the patient patient will continue appropriate diet and exercise as reviewed  Patient will recheck earlier if any interval problems of significance or clinical worsening of the above problems.  Agrees above surveillance.  All question were addressed regarding above meds

## 2024-12-25 ASSESSMENT — ENCOUNTER SYMPTOMS
FEVER: 0
DYSURIA: 0
BLOOD IN STOOL: 0
NAUSEA: 0
FATIGUE: 0
LIGHT-HEADEDNESS: 0
DIFFICULTY URINATING: 0
ABDOMINAL DISTENTION: 0
HEMATURIA: 0
ABDOMINAL PAIN: 0
FREQUENCY: 1
COUGH: 0
CHEST TIGHTNESS: 0
PALPITATIONS: 0
CONFUSION: 0
SLEEP DISTURBANCE: 0
SHORTNESS OF BREATH: 0
ARTHRALGIAS: 0
FLANK PAIN: 0
ANAL BLEEDING: 0
UNEXPECTED WEIGHT CHANGE: 0
MYALGIAS: 1
HEADACHES: 0
WEAKNESS: 0

## 2025-01-26 DIAGNOSIS — I10 PRIMARY HYPERTENSION: ICD-10-CM

## 2025-01-27 RX ORDER — METOPROLOL SUCCINATE 25 MG/1
25 TABLET, EXTENDED RELEASE ORAL DAILY
Qty: 90 TABLET | Refills: 3 | Status: SHIPPED | OUTPATIENT
Start: 2025-01-27

## 2025-01-27 RX ORDER — LOSARTAN POTASSIUM 100 MG/1
100 TABLET ORAL DAILY
Qty: 90 TABLET | Refills: 3 | Status: SHIPPED | OUTPATIENT
Start: 2025-01-27

## 2025-01-27 NOTE — TELEPHONE ENCOUNTER
Rx Refill Request     Name: Aman Blanco  :  1960   Medication Name:  losartan (Cozaar) 100 mg tablet, metoprolol succinate XL (Toprol-XL) 25 mg 24 hr tablet   Specific Pharmacy location:  [facility] - [place], [place]   Date of last appointment:  2024   Date of next appointment:  2025   Best number to reach patient:  [phone]

## 2025-01-31 ENCOUNTER — APPOINTMENT (OUTPATIENT)
Dept: PRIMARY CARE | Facility: CLINIC | Age: 65
End: 2025-01-31
Payer: COMMERCIAL

## 2025-01-31 VITALS
RESPIRATION RATE: 16 BRPM | HEART RATE: 58 BPM | SYSTOLIC BLOOD PRESSURE: 136 MMHG | BODY MASS INDEX: 29.26 KG/M2 | HEIGHT: 72 IN | DIASTOLIC BLOOD PRESSURE: 84 MMHG | OXYGEN SATURATION: 97 % | TEMPERATURE: 97.2 F | WEIGHT: 216 LBS

## 2025-01-31 DIAGNOSIS — J40 SINOBRONCHITIS: Primary | ICD-10-CM

## 2025-01-31 DIAGNOSIS — J32.9 SINOBRONCHITIS: Primary | ICD-10-CM

## 2025-01-31 DIAGNOSIS — N41.0 ACUTE BACTERIAL PROSTATITIS: ICD-10-CM

## 2025-01-31 DIAGNOSIS — R97.20 ELEVATED PSA: ICD-10-CM

## 2025-01-31 PROCEDURE — 3075F SYST BP GE 130 - 139MM HG: CPT | Performed by: FAMILY MEDICINE

## 2025-01-31 PROCEDURE — 3079F DIAST BP 80-89 MM HG: CPT | Performed by: FAMILY MEDICINE

## 2025-01-31 PROCEDURE — 3008F BODY MASS INDEX DOCD: CPT | Performed by: FAMILY MEDICINE

## 2025-01-31 PROCEDURE — 99213 OFFICE O/P EST LOW 20 MIN: CPT | Performed by: FAMILY MEDICINE

## 2025-01-31 RX ORDER — LEVOFLOXACIN 500 MG/1
500 TABLET, FILM COATED ORAL
Qty: 14 TABLET | Refills: 1 | Status: SHIPPED | OUTPATIENT
Start: 2025-01-31 | End: 2025-02-14

## 2025-01-31 NOTE — PROGRESS NOTES
Subjective   Patient ID: Aman Blanco is a 64 y.o. male who presents for Acute prostatitis (1 month follow up ).  HPI  64-year-old gentleman is here to recheck progress after taking quinolones for his prostatitis which might have contributed to his elevation of his PSA this winter from 5-6 he has no significant scrotal pain flank pain or bladder pressure.  Some nocturia continues and states he did better really on the quinolones than the earlier Bactrim to help his late dysuria and frequent urination compatible with chronic prostatitis his MRI last winter was normal and but at this time because of the rise from 5-6 we had to carefully treat his infection and then after 4 weeks recheck his PSA and follow-up in 5 weeks.  He is ready importance of this clinical surveillance.  Otherwise remains on 100 g of losartan daily for blood pressure as well as 25 mg metoprolol  Denies any resting or exertional chest pain shortness of breath or palpitations General Observe healthy routines especially with lesser amount of alcohol and pop to help his recurrent prostate infections.  5 days has some postnasal drainage sinus pressure and intermittent productive cough.  No high fever shaking chills malaise rash GI symptoms or significant   Otherwise no GI or  red flags after careful review  Try to follow a low-salt low-fat diet as wella  floMax in the evening.  Review of Systems   Constitutional:  Negative for fatigue, fever and unexpected weight change.   HENT:  Positive for rhinorrhea, sinus pressure and sinus pain.    Eyes:  Negative for visual disturbance.   Respiratory:  Positive for cough. Negative for chest tightness and shortness of breath.    Cardiovascular:  Negative for chest pain, palpitations and leg swelling.   Gastrointestinal:  Negative for abdominal pain and blood in stool.   Genitourinary:  Positive for dysuria and frequency. Negative for difficulty urinating, flank pain, hematuria and testicular pain.    Musculoskeletal:  Positive for myalgias. Negative for arthralgias.   Skin:  Negative for rash.   Neurological:  Positive for headaches. Negative for syncope, weakness, light-headedness and numbness.   Psychiatric/Behavioral:  Negative for behavioral problems, confusion, sleep disturbance and suicidal ideas.        Objective   /84 (BP Location: Left arm, Patient Position: Sitting, BP Cuff Size: Large adult)   Pulse 58   Temp 36.2 °C (97.2 °F) (Temporal)   Resp 16   Ht 1.829 m (6')   Wt 98 kg (216 lb)   SpO2 97%   BMI 29.29 kg/m²   PSA  Order: [id number]   Status: Final result       Visible to patient: Yes (seen)       Dx: Elevated PSA    2 Result Notes         Component  Ref Range & Units 1 mo ago 4 mo ago 4 yr ago 5 yr ago   Prostate Specific AG  <=4.00 ng/mL 6.05 High  5.01 High  3.70 R, CM 3.15 R, CM   Resulting Agency [facility]              Narrative  Performed by: [facility] [place], [place]  The FDA requires      l lesions are noted in the bone.       Impression   1.  BPH changes of the transition zone. Diffuse non nodular  hypointensities within the peripheral zone, without evidence of  focally restricted diffusion ( PI-RADS 2).  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           Physical Exam  Vital signs reviewed and normal weight is up about 4 to 5 pounds  General-inspection of the pleasant or active individual in no distress  ENT eyes clear PERRL bilaterally nose with engorged turbinates regarding left with yellow rhinorrhea minimal tenderness right maxillary sinus both TMs scarred retracted right greater than left oral exam shows injection only  Neck neck is all without mass adenopathy bruits rigidity---Cardiovascular RSR without murmurs gallop or ectopy  Abdominal no CVA tenderness no suprapubic fullness no testicular masses otherwise no organomegaly mass rebound  Peripheral vascular no symmetric or asymmetric edema no sensorimotor vascular deficits  pulmonary bronchial  breathing otherwise no wheezing rales or rhonchi      Assessment/Plan   Problem List Items Addressed This Visit    None  During sinobronchitis and especially returning some prostate irritation will place on Levaquin for above respiratory and  infection 5 and milligrams for 14 days 1 daily at lunch use and side effects reviewed  Continue low-salt caffeine reduction alcohol reduction  Continue weaning off smoking  Will get his PSA checked in 4 weeks report in 5 weeks.  If continued elevation if not rising then will refer to urology to consider if biopsy would be prudent versus observation he agrees above surveillance importance of follow-up continue supportive therapy Mucinex DM as needed for cough and nasal saline otherwise of the Levaquin will help his residual  infection as well as new sinobronchitis  @discharge  The above diagnosis and treatment plan was discussed with the patient patient will continue appropriate diet and exercise as reviewed  Patient will recheck earlier if any interval problems of significance or clinical worsening of the above problems.  Agrees above surveillance.  All question were addressed regarding above meds

## 2025-02-04 ASSESSMENT — ENCOUNTER SYMPTOMS
WEAKNESS: 0
HEADACHES: 1
CONFUSION: 0
FLANK PAIN: 0
FEVER: 0
DIFFICULTY URINATING: 0
ARTHRALGIAS: 0
MYALGIAS: 1
BLOOD IN STOOL: 0
DYSURIA: 1
CHEST TIGHTNESS: 0
NUMBNESS: 0
FREQUENCY: 1
PALPITATIONS: 0
SLEEP DISTURBANCE: 0
RHINORRHEA: 1
SINUS PRESSURE: 1
UNEXPECTED WEIGHT CHANGE: 0
SHORTNESS OF BREATH: 0
FATIGUE: 0
SINUS PAIN: 1
LIGHT-HEADEDNESS: 0
HEMATURIA: 0
COUGH: 1
ABDOMINAL PAIN: 0

## 2025-03-06 LAB — PSA SERPL-MCNC: 4.2 NG/ML

## 2025-03-12 ENCOUNTER — APPOINTMENT (OUTPATIENT)
Dept: PRIMARY CARE | Facility: CLINIC | Age: 65
End: 2025-03-12
Payer: COMMERCIAL

## 2025-03-12 VITALS
BODY MASS INDEX: 28.99 KG/M2 | WEIGHT: 214 LBS | DIASTOLIC BLOOD PRESSURE: 86 MMHG | OXYGEN SATURATION: 97 % | RESPIRATION RATE: 16 BRPM | HEART RATE: 68 BPM | TEMPERATURE: 97.2 F | HEIGHT: 72 IN | SYSTOLIC BLOOD PRESSURE: 134 MMHG

## 2025-03-12 DIAGNOSIS — N41.0 ACUTE BACTERIAL PROSTATITIS: Primary | ICD-10-CM

## 2025-03-12 DIAGNOSIS — I10 PRIMARY HYPERTENSION: ICD-10-CM

## 2025-03-12 DIAGNOSIS — E78.5 DYSLIPIDEMIA: ICD-10-CM

## 2025-03-12 DIAGNOSIS — R97.20 ELEVATED PSA: ICD-10-CM

## 2025-03-12 PROCEDURE — 3008F BODY MASS INDEX DOCD: CPT | Performed by: FAMILY MEDICINE

## 2025-03-12 PROCEDURE — 3079F DIAST BP 80-89 MM HG: CPT | Performed by: FAMILY MEDICINE

## 2025-03-12 PROCEDURE — 99213 OFFICE O/P EST LOW 20 MIN: CPT | Performed by: FAMILY MEDICINE

## 2025-03-12 PROCEDURE — 3075F SYST BP GE 130 - 139MM HG: CPT | Performed by: FAMILY MEDICINE

## 2025-03-12 NOTE — PROGRESS NOTES
Subjective   Patient ID: Aman Blanco is a 64 y.o. male who presents for Acute prostatitis (6 week follow up ).  HPI  74-year-old gent with a history of BPH and elevated PSA who segmentally reduce his caffeine incidentally reduce his alcohol to help his elevated PSA in the last 3 months his PSA has gone from 6.0 down to 4.2 he has much better voiding since taking 2 to 3 weeks of Levaquin 500 mg since the end of January otherwise he has no scrotal pain no bladder pain no flank pain no hematuria and no dysuria at all.  The patient does take Cozaar 100 mg the morning as well as nighttime metoprolol 25 mg takes Flomax now after supper with good voiding otherwise happy as well as I with the reduction of PSA on antibiotics alone.  MRI a year and a half ago showed no evidence of abnormalities on his MRI of the prostate  Denies any chest pain short of breath or palpitations tries to follow a low-salt especially low alcohol and low-fat diet  Agrees to recheck in 3 to 4 months again the PSA and LDL.  Otherwise trying observe healthier routines.  Review of Systems   Constitutional:  Negative for fatigue, fever and unexpected weight change.   Eyes:  Negative for visual disturbance.   Respiratory:  Negative for cough, chest tightness and shortness of breath.    Cardiovascular:  Negative for chest pain, palpitations and leg swelling.   Gastrointestinal:  Negative for abdominal pain and blood in stool.   Genitourinary:  Positive for frequency. Negative for decreased urine volume, difficulty urinating, dysuria, hematuria and testicular pain.   Musculoskeletal:  Negative for arthralgias and myalgias.   Skin:  Negative for rash.   Neurological:  Negative for weakness and headaches.   Psychiatric/Behavioral:  Negative for behavioral problems and sleep disturbance.        Objective   /86 (BP Location: Right arm, Patient Position: Sitting, BP Cuff Size: Large adult)   Pulse 68   Temp 36.2 °C (97.2 °F) (Temporal)   Resp 16    Ht 1.829 m (6')   Wt 97.1 kg (214 lb)   SpO2 97%   BMI 29.02 kg/m²     Lab Results   Component Value Date    PSA 4.20 (H) 03/06/2025    PSA 6.05 (H) 12/17/2024    PSA 5.01 (H) 10/02/2024     PROSTATE PARENCHYMA:   Prominence of the median lobe causing indentation over the bladder   floor. There are BPH changes of the transition zone noted. The   peripheral zone is diffusely heterogenous on T2WI, with bilateral   polygonal and wedge-shaped T2-hypointense areas, that show no signs   of abnormally restricted diffusion (PI-RADS 2).      LDL cholesterol, direct  Order: [id number]   Status: Final result       Visible to patient: Yes (seen)       Dx: Dyslipidemia    4 Result Notes       1  Topic      Component  Ref Range & Units 5 mo ago   LDL, Direct  0 - 129 mg/dL 123   Resulting Agency [facility]              Narrative  Performed by: [facility]  Elevated levels of LDL cholesterol are recognized as a key factor in the development of atherosclerosis and CHD. The direct LDL cho     Physical Exam  Vital signs including pulse ox and blood pressure are normal.  General inspection reveals a more relaxed individual no distress  Neck neck is without mass adenopathy bruits rigidity  Pulmonary chest clear without wheezing rales or rhonchi  Cardiovascular RSR without murmurs gallop or ectopy  Abdominal no CVA tenderness no organomegaly masses or rebound no suprapubic fullness  Peripheral vascular no edema and no sensorimotor vascular deficit  Happy PSA went from 6 down to 4.2 down to 123      Assessment/Plan   Problem List Items Addressed This Visit    None  Manage after antibiotics for his prostate  Will continue strict moderation in his alcohol and caffeine intake  Continue Flomax after supper  Continue Cozaar in the morning and metoprolol at nighttime 3 to 4 months from now recheck LDL to make sure he continues to improve his LDL and also to continue to follow his PSA follow-up in 3-1/2 months all question addressed very pleased  with reduction almost 5 to of his PSA with treatment of his infection and reduction of alcohol  @discharge  The above diagnosis and treatment plan was discussed with the patient patient will continue appropriate diet and exercise as reviewed  Patient will recheck earlier if any interval problems of significance or clinical worsening of the above problems.  Agrees above surveillance.  All question were addressed regarding above meds

## 2025-03-16 ASSESSMENT — ENCOUNTER SYMPTOMS
BLOOD IN STOOL: 0
SLEEP DISTURBANCE: 0
UNEXPECTED WEIGHT CHANGE: 0
HEADACHES: 0
FREQUENCY: 1
DIFFICULTY URINATING: 0
FATIGUE: 0
HEMATURIA: 0
ABDOMINAL PAIN: 0
MYALGIAS: 0
FEVER: 0
PALPITATIONS: 0
SHORTNESS OF BREATH: 0
CHEST TIGHTNESS: 0
COUGH: 0
WEAKNESS: 0
DYSURIA: 0
ARTHRALGIAS: 0

## 2025-03-31 ENCOUNTER — APPOINTMENT (OUTPATIENT)
Dept: DERMATOLOGY | Facility: CLINIC | Age: 65
End: 2025-03-31
Payer: COMMERCIAL

## 2025-03-31 DIAGNOSIS — L57.0 ACTINIC KERATOSES: ICD-10-CM

## 2025-03-31 DIAGNOSIS — D18.01 HEMANGIOMA OF SKIN: ICD-10-CM

## 2025-03-31 DIAGNOSIS — L57.9 SKIN CHANGES DUE TO CHRONIC EXPOSURE TO NONIONIZING RADIATION: Primary | ICD-10-CM

## 2025-03-31 DIAGNOSIS — D48.5 NEOPLASM OF UNCERTAIN BEHAVIOR OF SKIN: ICD-10-CM

## 2025-03-31 DIAGNOSIS — Z12.83 SCREENING EXAM FOR SKIN CANCER: ICD-10-CM

## 2025-03-31 DIAGNOSIS — L81.4 LENTIGO: ICD-10-CM

## 2025-03-31 DIAGNOSIS — L82.1 SEBORRHEIC KERATOSIS: ICD-10-CM

## 2025-03-31 PROCEDURE — 99203 OFFICE O/P NEW LOW 30 MIN: CPT | Performed by: STUDENT IN AN ORGANIZED HEALTH CARE EDUCATION/TRAINING PROGRAM

## 2025-03-31 PROCEDURE — 17003 DESTRUCT PREMALG LES 2-14: CPT | Performed by: STUDENT IN AN ORGANIZED HEALTH CARE EDUCATION/TRAINING PROGRAM

## 2025-03-31 PROCEDURE — 17000 DESTRUCT PREMALG LESION: CPT | Performed by: STUDENT IN AN ORGANIZED HEALTH CARE EDUCATION/TRAINING PROGRAM

## 2025-03-31 PROCEDURE — 11103 TANGNTL BX SKIN EA SEP/ADDL: CPT | Performed by: STUDENT IN AN ORGANIZED HEALTH CARE EDUCATION/TRAINING PROGRAM

## 2025-03-31 PROCEDURE — 11102 TANGNTL BX SKIN SINGLE LES: CPT | Performed by: STUDENT IN AN ORGANIZED HEALTH CARE EDUCATION/TRAINING PROGRAM

## 2025-03-31 NOTE — PROGRESS NOTES
Subjective     Aman Blanco is a 64 y.o. male who presents for the following: Skin Check (Upper body skin exam, has a few lesions of concern. No Hx of skin cancer. ).     Review of Systems:  No other skin or systemic complaints other than what is documented elsewhere in the note.    The following portions of the chart were reviewed this encounter and updated as appropriate:          Skin Cancer History  No skin cancer on file.      Specialty Problems          Dermatology Problems    Arm skin lesion, right        Objective   Well appearing patient in no apparent distress; mood and affect are within normal limits.    A focused skin examination was performed. All findings within normal limits unless otherwise noted below.    Assessment/Plan   1. Skin changes due to chronic exposure to nonionizing radiation  Mottled pigmentation, telangiectasias and brown reticular macules in sun exposed areas on the face, extremities, trunk    The risk of chronic, cumulative sun damage and risk of development of skin cancer was reviewed with the patient today. Discussed important of sun protection with sun protective clothing and/or broad spectrum sunscreen spf 30 or above.  Warning signs of skin cancer were reviewed. Patient to contact office should they notice any new or changing pre-existing skin lesion.    Related Procedures  Follow Up In Dermatology - Established Patient    2. Screening exam for skin cancer    3. Neoplasm of uncertain behavior of skin (2)  Mid Back  5 mm fleshy pink papule           Lesion biopsy  Type of biopsy: tangential    Informed consent: discussed and consent obtained    Timeout: patient name, date of birth, surgical site, and procedure verified    Procedure prep:  Patient was prepped and draped  Anesthesia: the lesion was anesthetized in a standard fashion    Anesthetic:  1% lidocaine w/ epinephrine 1-100,000 local infiltration  Instrument used: DermaBlade    Hemostasis achieved with: aluminum  chloride    Outcome: patient tolerated procedure well    Post-procedure details: sterile dressing applied and wound care instructions given    Dressing type: petrolatum and bandage      Staff Communication: Dermatology Local Anesthesia: 1 % Lidocaine / Epinephrine - Amount: 3 ml    Specimen 1 - Dermatopathology- DERM LAB  Differential Diagnosis: nf vs idn  Check Margins Yes/No?:    Comments:    Dermpath Lab: Routine Histopathology (formalin-fixed tissue)    Left Upper Back  6 mm pink macule with dilated blood vessels           Lesion biopsy  Type of biopsy: tangential    Informed consent: discussed and consent obtained    Timeout: patient name, date of birth, surgical site, and procedure verified    Procedure prep:  Patient was prepped and draped  Anesthesia: the lesion was anesthetized in a standard fashion    Anesthetic:  1% lidocaine w/ epinephrine 1-100,000 local infiltration  Instrument used: DermaBlade    Hemostasis achieved with: aluminum chloride    Outcome: patient tolerated procedure well    Post-procedure details: sterile dressing applied and wound care instructions given    Dressing type: petrolatum and bandage      Staff Communication: Dermatology Local Anesthesia: 1 % Lidocaine / Epinephrine - Amount: 3 ml    Specimen 2 - Dermatopathology- DERM LAB  Differential Diagnosis: bcc vs ak  Check Margins Yes/No?:    Comments:    Dermpath Lab: Routine Histopathology (formalin-fixed tissue)    Concerning lesion found on exam. DDX for lesion includes: r/o nmsc. The need for biopsy to aid in diagnosis was discussed and recommended. Risks and benefits of biopsy were reviewed. See procedure note.    4. Actinic keratoses (14)  Dorsum of Nose, Left Dorsal Hand, Left Ear (2), Left Forehead (2), Left Nasal Sidewall, Left Temple, Mid Forehead, Right Dorsal Hand, Right Ear, Right Nasal Sidewall, Right Temple, Right Zygomatic Area  Erythematous gritty macule(s)    Lesions are due to chronic, cumulative sun damage over  time and are pre-cancerous. They have a risk of developing into squamous cell carcinoma and therefore treatment recommendations were offered and discussed with the patient. Discussed LN2 & topical chemotherapy creams, risks and benefits of each. The risks and benefits of LN2 were reviewed including incomplete removal, crusting, blister hypo and/or hyperpigmentation, scarring. The patient elected for LN2 today.    Destr of lesion - Dorsum of Nose, Left Dorsal Hand, Left Ear (2), Left Forehead (2), Left Nasal Sidewall, Left Temple, Mid Forehead, Right Dorsal Hand, Right Ear, Right Nasal Sidewall, Right Temple, Right Zygomatic Area  Complexity: simple    Destruction method: cryotherapy    Informed consent: discussed and consent obtained    Lesion destroyed using liquid nitrogen: Yes    Region frozen until ice ball extended beyond lesion: Yes    Cryotherapy cycles:  1  Outcome: patient tolerated procedure well with no complications    Post-procedure details: wound care instructions given      5. Lentigo  Scattered tan macules in sun-exposed areas.    Benign nature of these skin lesions reviewed and relation to sun exposure discussed. Reassurance provided. Reviewed warning signs of skin cancer.    6. Seborrheic keratosis  Stuck on verrucous, tan-brown papules and plaques.      The benign nature of these skin lesions were reviewed with the patient today and reassurance was provided. Patient advised to return for f/u if the lesions change in size, shape, color, become painful, tender, itch or bleed.    7. Hemangioma of skin  Bright red papules    Discussed benign nature of condition, reassured. Reviewed warning signs of skin cancer with patient.

## 2025-04-02 LAB
LABORATORY COMMENT REPORT: NORMAL
PATH REPORT.FINAL DX SPEC: NORMAL
PATH REPORT.GROSS SPEC: NORMAL
PATH REPORT.MICROSCOPIC SPEC OTHER STN: NORMAL
PATH REPORT.RELEVANT HX SPEC: NORMAL
PATH REPORT.TOTAL CANCER: NORMAL

## 2025-04-03 NOTE — RESULT ENCOUNTER NOTE
"Patient has seen shave biopsy results and \"Patient Communication\" on 4/02/2025 at 5:39 PM:   1.Mid Back- Neurofibroma  2. Left Upper Back- Dermatofibroma.  Dr. La noted both results as benign."

## 2025-06-11 ENCOUNTER — APPOINTMENT (OUTPATIENT)
Dept: PRIMARY CARE | Facility: CLINIC | Age: 65
End: 2025-06-11
Payer: COMMERCIAL

## 2025-06-11 VITALS
BODY MASS INDEX: 28.71 KG/M2 | DIASTOLIC BLOOD PRESSURE: 80 MMHG | HEIGHT: 72 IN | OXYGEN SATURATION: 96 % | HEART RATE: 59 BPM | SYSTOLIC BLOOD PRESSURE: 132 MMHG | WEIGHT: 212 LBS | TEMPERATURE: 97 F | RESPIRATION RATE: 16 BRPM

## 2025-06-11 DIAGNOSIS — R39.11 BENIGN PROSTATIC HYPERPLASIA WITH URINARY HESITANCY: Primary | ICD-10-CM

## 2025-06-11 DIAGNOSIS — J32.9 SINOBRONCHITIS: ICD-10-CM

## 2025-06-11 DIAGNOSIS — N41.0 ACUTE BACTERIAL PROSTATITIS: ICD-10-CM

## 2025-06-11 DIAGNOSIS — N40.1 BENIGN PROSTATIC HYPERPLASIA WITH URINARY HESITANCY: Primary | ICD-10-CM

## 2025-06-11 DIAGNOSIS — J40 SINOBRONCHITIS: ICD-10-CM

## 2025-06-11 PROCEDURE — 3075F SYST BP GE 130 - 139MM HG: CPT | Performed by: FAMILY MEDICINE

## 2025-06-11 PROCEDURE — 3008F BODY MASS INDEX DOCD: CPT | Performed by: FAMILY MEDICINE

## 2025-06-11 PROCEDURE — 3079F DIAST BP 80-89 MM HG: CPT | Performed by: FAMILY MEDICINE

## 2025-06-11 PROCEDURE — 4004F PT TOBACCO SCREEN RCVD TLK: CPT | Performed by: FAMILY MEDICINE

## 2025-06-11 PROCEDURE — 99213 OFFICE O/P EST LOW 20 MIN: CPT | Performed by: FAMILY MEDICINE

## 2025-06-11 RX ORDER — LEVOFLOXACIN 500 MG/1
500 TABLET, FILM COATED ORAL
Qty: 14 TABLET | Refills: 1 | Status: SHIPPED | OUTPATIENT
Start: 2025-06-11 | End: 2025-07-09

## 2025-06-11 ASSESSMENT — PATIENT HEALTH QUESTIONNAIRE - PHQ9
2. FEELING DOWN, DEPRESSED OR HOPELESS: NOT AT ALL
SUM OF ALL RESPONSES TO PHQ9 QUESTIONS 1 AND 2: 0
1. LITTLE INTEREST OR PLEASURE IN DOING THINGS: NOT AT ALL

## 2025-06-11 NOTE — PROGRESS NOTES
Subjective   Patient ID: Aman Blanco is a 64 y.o. male who presents for Acute bacterial prostatitis (3 month follow up ).  HPI  64-year-old history of hypertension doing well on metoprolol 25 at nighttime as well as Cozaar 100 mg in the morning does take Flomax at night for his BPH.  Regarding BPH with antibiotics his PSA earlier this spring went from 6 Leandra down to 4.2 he has try to reduce his alcohol intake caffeine intake and treat his infection appropriately some cough in the last week and then in the last 2 days had some mild dysuria has done best of all antibiotics for his prostatitis and PSA on Levaquin as compared to Bactrim and or Cipro.  I did review diet and importance of follow-up as he did not get his PSA repeat or his LDL that I ordered in March.  Cough is only minimally productive but did instruct him to try to wean off smoking but otherwise only minimal postnasal drainage and sinus pressure otherwise no chest pain short of air palpitations no flank pain no hematuria no scrotal pain is returned since he had some burning otherwise chronic meds reviewed no reported side effects her chemistries as well as urinalysis was normal  Review of Systems   Constitutional:  Negative for fatigue, fever and unexpected weight change.   HENT:  Positive for rhinorrhea and sinus pressure. Negative for sore throat.    Eyes:  Negative for visual disturbance.   Respiratory:  Positive for cough. Negative for chest tightness, shortness of breath and wheezing.    Cardiovascular:  Negative for chest pain, palpitations and leg swelling.   Gastrointestinal:  Negative for abdominal distention, abdominal pain, blood in stool, nausea and vomiting.   Genitourinary:  Positive for dysuria and frequency. Negative for flank pain, hematuria and testicular pain.   Musculoskeletal:  Positive for myalgias. Negative for arthralgias.   Skin:  Negative for rash.   Neurological:  Negative for syncope, weakness, light-headedness,  numbness and headaches.   Psychiatric/Behavioral:  Negative for behavioral problems, confusion, decreased concentration, sleep disturbance and suicidal ideas.        Objective   /80 (BP Location: Left arm, Patient Position: Sitting, BP Cuff Size: Large adult)   Pulse 59   Temp 36.1 °C (97 °F) (Temporal)   Resp 16   Ht 1.829 m (6')   Wt 96.2 kg (212 lb)   SpO2 96%   BMI 28.75 kg/m²   ains abnormal data Prostate Specific Antigen  Order: [id number]   Status: Final result    Test Result Released: Yes (seen)    2 Result Notes      Component  Ref Range & Units 3 mo ago   PSA, TOTAL  < OR = 4.00 ng/mL 4.20 High    Comment: The total PSA value from this assay system is  standardized against the WHO standard. The test  result will be approximately 20% lower when compared  to the equimolar-standardized total PSA (Sophia  Cindy). Comparison of serial PSA results should be  interpreted with this fact in mind.     This test was performed         LDL cholesterol, direct  Order: [id number]   Status: Final result       Dx: Dyslipidemia    Test Result Released: Yes (seen)    4 Result Notes       1  Topic      Component  Ref Range & Units 8 mo ago   LDL, Direct  0 - 129 mg/dL 123   Resulting Agency [facility]             Contains abnormal data Comprehensive metabolic panel  Order: [id number]   Status: Final result       Dx: Primary hypertension    Test Result Released: Yes (seen)    4 Result Notes          Component  Ref Range & Units 8 mo ago 2 yr ago 3 yr ago 4 yr ago 5 yr ago   Glucose  74 - 99 mg/dL 86 85 88 95 104 High    Sodium  136 - 145 mmol/L 140 139 139 141 140   Potassium  3.5 - 5.3 mmol/L 4.4 4.0 4.2 3.9 4.4   Chloride  98 - 107 mmol/L 104 106 105 105 105   Bicarbonate  21 - 32 mmol/L 31 28 29 28 28   Anion Gap  10 - 20 mmol/L 9 Low  9 Low  9 Low  12 11   Urea Nitrogen  6 - 23 mg/dL 13 14 16 15 12   Creatinine  0.50 - 1.30 mg/dL 0.86 0.92 0.89 0.99 0.92   eGFR  >60 mL/min/1.73m*2 >90       Comment: Calculations  of estimated GFR are performed using the 2021 CKD-EPI Study Refit equation without the race variable for the IDMS-Traceable creatinine methods.  https://jasn.asnjournals.org/content/early/2021/09/22/ASN.6521261416   Calcium  8.6 - 10.3 mg/dL 9.2 9.1 9.1 9.3 9.3 R   Albumin  3.4 - 5.0 g/dL 4.5 4.0 4.3 4.5 4.5   Alkaline Phosphatase  33 - 136 U/L 79 67 75 91 R 88 R   Total Protein  6.4 - 8.2 g/dL 7.1 7.1 6.9 7.2 6.9   AST  9 - 39 U/L 24 21 21 21 27   Bilirubin, Total  0.0 - 1.2 mg/dL 1.1 1.0 0.9 0.9 0.7   ALT  10 - 52 U/L 30 21 CM 23 CM 21 CM 48 CM   Comment: Patients treated with Sulfasalazine may generate falsely decreased results for ALT.   Resulting Ag             Narrative    Braxton Mac MA on 12/20/2024 16:33   Braydon Kelsey DO on 12/20/2024 16:27     POCT UA Automated manually resulted  Order: [id number]   Status: Final result       Next appt: 07/09/2025 at 08:45 AM in Primary [facility] (Braydon Kelsey DO)       Dx: Acute bacterial prostatitis    Test Result Released: Yes (not seen)    2 Result Notes          Component  Ref Range & Units 5 mo ago  (12/20/24) 10 mo ago  (8/7/24) 11 mo ago  (7/17/24) 11 mo ago  (6/28/24) 1 yr ago  (6/19/23)   POC Color, Urine  Straw, Yellow, Light-Yellow Yellow Yellow Yellow Yellow Yellow R   POC Appearance, Urine  Clear Clear Clear Clear Clear Clear   POC Glucose, Urine  NEGATIVE mg/dl NEGATIVE NEGATIVE NEGATIVE NEGATIVE NEGATIVE   POC Bilirubin, Urine  NEGATIVE NEGATIVE NEGATIVE NEGATIVE NEGATIVE NEGATIVE   POC Ketones, Urine  NEGATIVE mg/dl NEGATIVE NEGATIVE NEGATIVE NEGATIVE NEGATIVE   POC Specific Gravity, Urine  1.005 - 1.035 >=1.030 >=1.030 1.020 1.015 >=1.030   POC Blood, Urine  NEGATIVE NEGATIVE NEGATIVE NEGATIVE NEGATIVE NEGATIVE   POC PH, Urine  No Reference Range Established PH 6.0 6.0 5.5 6.5 5.5   POC Protein, Urine  NEGATIVE mg/dl NEGATIVE NEGATIVE R NEGATIVE R NEGATIVE R NEGATIVE R   POC Urobilinogen, Urine  0.2, 1.0 EU/DL 0.2 0.2 0.2 0.2 0.2   Poc Nitrite,  Urine  NEGATIVE NEGATIVE NEGATIVE NEGATIVE NEGATIVE NEGATIVE   POC Leukocytes, Urine  NEGATIVE NEGAT              Physical Exam  Vital sign reviewed normal 4 pound weight loss by watching his diet over the last 4 to 6 months  General-inspection was a pleasant active individual no distress  Abdominal no flank pain no suprapubic fullness supple and on deep palpation otherwise remaining abdomen completely benign no organomegaly mass or rebound  Neck neck is supple no mass adenopathy bruits rigidity  ENT yellow rhinorrhea regular left with minimal tenderness right maxillary sinus TMs retracted only  Cardiovascular RSR without murmurs gallop or ectopy  Chest very faint expiratory rhonchi clear with coughing otherwise no wheezing rales or rubs  Peripheral vascular no symmetric or asymmetric edema      Assessment/Plan   Problem List Items Addressed This Visit       Acute bacterial prostatitis    Sinobronchitis   His LDL PSA drawn we will draw the right before I see him in 4 to 5 weeks placed on Levaquin 14 days with recent mild returning prostate swelling symptoms  Happy earlier PSA dropped but need to maintain on a lower alcohol diet  Try to get off smoking  For sinobronchitis also the Levaquin should help this for 14 days for the prostate as well as bronchitis  Continue low-salt diet as well as his metoprolol and losartan.  All question were addressed increasing liquids supportive therapy discussed as well as importance of good diet  Follow-up in 4 to 6 weeks to review PSA after treatment as well as his LDL cholesterol  @discharge  The above diagnosis and treatment plan was discussed with the patient patient will continue appropriate diet and exercise as reviewed  Patient will recheck earlier if any interval problems of significance or clinical worsening of the above problems.  Agrees above surveillance.  All question were addressed regarding above meds

## 2025-06-15 ASSESSMENT — ENCOUNTER SYMPTOMS
COUGH: 1
FREQUENCY: 1
SHORTNESS OF BREATH: 0
FEVER: 0
DECREASED CONCENTRATION: 0
VOMITING: 0
SLEEP DISTURBANCE: 0
PALPITATIONS: 0
NAUSEA: 0
WEAKNESS: 0
ABDOMINAL DISTENTION: 0
FATIGUE: 0
FLANK PAIN: 0
HEADACHES: 0
WHEEZING: 0
BLOOD IN STOOL: 0
SINUS PRESSURE: 1
CONFUSION: 0
CHEST TIGHTNESS: 0
ABDOMINAL PAIN: 0
SORE THROAT: 0
DYSURIA: 1
MYALGIAS: 1
RHINORRHEA: 1
NUMBNESS: 0
UNEXPECTED WEIGHT CHANGE: 0
LIGHT-HEADEDNESS: 0
HEMATURIA: 0
ARTHRALGIAS: 0

## 2025-07-09 ENCOUNTER — APPOINTMENT (OUTPATIENT)
Dept: PRIMARY CARE | Facility: CLINIC | Age: 65
End: 2025-07-09
Payer: COMMERCIAL

## 2025-08-04 DIAGNOSIS — J40 SINOBRONCHITIS: ICD-10-CM

## 2025-08-04 DIAGNOSIS — N41.0 ACUTE BACTERIAL PROSTATITIS: ICD-10-CM

## 2025-08-04 DIAGNOSIS — J32.9 SINOBRONCHITIS: ICD-10-CM

## 2025-08-04 NOTE — TELEPHONE ENCOUNTER
Patient called stating that he needs to be seen for an issue that is prostate related.   levoFLOXacin (Levaquin) 500 mg tablet     Patient stated that  sends this medication every time he has a sudden flare up   Patient scheduled 8/11/25  815am   Please advise

## 2025-08-05 RX ORDER — LEVOFLOXACIN 500 MG/1
500 TABLET, FILM COATED ORAL
Qty: 14 TABLET | Refills: 1 | Status: SHIPPED | OUTPATIENT
Start: 2025-08-05 | End: 2025-09-02

## 2025-08-08 LAB
LDLC SERPL DIRECT ASSAY-MCNC: 119 MG/DL
PSA SERPL-MCNC: 4.94 NG/ML

## 2025-08-11 ENCOUNTER — APPOINTMENT (OUTPATIENT)
Dept: PRIMARY CARE | Facility: CLINIC | Age: 65
End: 2025-08-11

## 2025-08-11 VITALS
DIASTOLIC BLOOD PRESSURE: 74 MMHG | BODY MASS INDEX: 28.71 KG/M2 | RESPIRATION RATE: 16 BRPM | SYSTOLIC BLOOD PRESSURE: 122 MMHG | WEIGHT: 212 LBS | OXYGEN SATURATION: 96 % | HEIGHT: 72 IN | TEMPERATURE: 97.3 F | HEART RATE: 53 BPM

## 2025-08-11 DIAGNOSIS — R39.11 BENIGN PROSTATIC HYPERPLASIA WITH URINARY HESITANCY: ICD-10-CM

## 2025-08-11 DIAGNOSIS — N40.1 BENIGN PROSTATIC HYPERPLASIA WITH URINARY HESITANCY: ICD-10-CM

## 2025-08-11 DIAGNOSIS — N41.0 ACUTE BACTERIAL PROSTATITIS: ICD-10-CM

## 2025-08-11 DIAGNOSIS — R97.20 ELEVATED PSA: Primary | ICD-10-CM

## 2025-08-11 LAB
POC APPEARANCE, URINE: CLEAR
POC BILIRUBIN, URINE: NEGATIVE
POC BLOOD, URINE: NEGATIVE
POC COLOR, URINE: YELLOW
POC GLUCOSE, URINE: NEGATIVE MG/DL
POC KETONES, URINE: NEGATIVE MG/DL
POC LEUKOCYTES, URINE: NEGATIVE
POC NITRITE,URINE: NEGATIVE
POC PH, URINE: 6 PH
POC PROTEIN, URINE: NEGATIVE MG/DL
POC SPECIFIC GRAVITY, URINE: 1.02
POC UROBILINOGEN, URINE: 0.2 EU/DL

## 2025-08-11 PROCEDURE — 3008F BODY MASS INDEX DOCD: CPT | Performed by: FAMILY MEDICINE

## 2025-08-11 PROCEDURE — 3078F DIAST BP <80 MM HG: CPT | Performed by: FAMILY MEDICINE

## 2025-08-11 PROCEDURE — 3074F SYST BP LT 130 MM HG: CPT | Performed by: FAMILY MEDICINE

## 2025-08-11 PROCEDURE — 81003 URINALYSIS AUTO W/O SCOPE: CPT | Performed by: FAMILY MEDICINE

## 2025-08-11 PROCEDURE — 99213 OFFICE O/P EST LOW 20 MIN: CPT | Performed by: FAMILY MEDICINE

## 2025-08-14 ASSESSMENT — ENCOUNTER SYMPTOMS
FEVER: 0
SLEEP DISTURBANCE: 0
WEAKNESS: 0
FREQUENCY: 1
PALPITATIONS: 0
HEMATURIA: 0
COUGH: 0
BLOOD IN STOOL: 0
ARTHRALGIAS: 0
MYALGIAS: 0
FLANK PAIN: 0
SHORTNESS OF BREATH: 0
CHEST TIGHTNESS: 0
ABDOMINAL PAIN: 0
FATIGUE: 0
DYSURIA: 1
HEADACHES: 0

## 2025-09-17 ENCOUNTER — APPOINTMENT (OUTPATIENT)
Dept: PRIMARY CARE | Facility: CLINIC | Age: 65
End: 2025-09-17
Payer: COMMERCIAL

## 2025-10-06 ENCOUNTER — APPOINTMENT (OUTPATIENT)
Dept: DERMATOLOGY | Facility: CLINIC | Age: 65
End: 2025-10-06
Payer: COMMERCIAL